# Patient Record
Sex: FEMALE | Race: WHITE | NOT HISPANIC OR LATINO | Employment: OTHER | RURAL
[De-identification: names, ages, dates, MRNs, and addresses within clinical notes are randomized per-mention and may not be internally consistent; named-entity substitution may affect disease eponyms.]

---

## 2019-12-09 ENCOUNTER — HISTORICAL (OUTPATIENT)
Dept: ADMINISTRATIVE | Facility: HOSPITAL | Age: 64
End: 2019-12-09

## 2019-12-11 LAB
LAB AP GENERAL CAT - HISTORICAL: NORMAL
LAB AP INTERPRETATION/RESULT - HISTORICAL: NEGATIVE
LAB AP SPECIMEN ADEQUACY - HISTORICAL: NORMAL
LAB AP SPECIMEN SUBMITTED - HISTORICAL: NORMAL

## 2020-02-18 LAB — CRC RECOMMENDATION EXT: NORMAL

## 2021-11-02 ENCOUNTER — OFFICE VISIT (OUTPATIENT)
Dept: FAMILY MEDICINE | Facility: CLINIC | Age: 66
End: 2021-11-02
Payer: MEDICARE

## 2021-11-02 VITALS
RESPIRATION RATE: 20 BRPM | BODY MASS INDEX: 23.01 KG/M2 | OXYGEN SATURATION: 97 % | DIASTOLIC BLOOD PRESSURE: 82 MMHG | TEMPERATURE: 97 F | HEART RATE: 64 BPM | SYSTOLIC BLOOD PRESSURE: 126 MMHG | WEIGHT: 146.63 LBS | HEIGHT: 67 IN

## 2021-11-02 DIAGNOSIS — Z12.31 SCREENING MAMMOGRAM FOR BREAST CANCER: ICD-10-CM

## 2021-11-02 DIAGNOSIS — Z87.891 FORMER HEAVY CIGARETTE SMOKER (20-39 PER DAY): ICD-10-CM

## 2021-11-02 DIAGNOSIS — J43.2 CENTRILOBULAR EMPHYSEMA: ICD-10-CM

## 2021-11-02 DIAGNOSIS — Z53.20 STATIN DECLINED: ICD-10-CM

## 2021-11-02 DIAGNOSIS — Z23 FLU VACCINE NEED: ICD-10-CM

## 2021-11-02 DIAGNOSIS — Z85.038 HISTORY OF COLON CANCER, STAGE II: Primary | ICD-10-CM

## 2021-11-02 DIAGNOSIS — E78.00 HYPERCHOLESTEREMIA: ICD-10-CM

## 2021-11-02 PROBLEM — J43.9 EMPHYSEMA OF LUNG: Status: ACTIVE | Noted: 2019-12-11

## 2021-11-02 PROCEDURE — 99213 PR OFFICE/OUTPT VISIT, EST, LEVL III, 20-29 MIN: ICD-10-PCS | Mod: ,,, | Performed by: NURSE PRACTITIONER

## 2021-11-02 PROCEDURE — 90662 FLU VACCINE - QUADRIVALENT - HIGH DOSE (65+) PRESERVATIVE FREE IM: ICD-10-PCS | Mod: ,,, | Performed by: NURSE PRACTITIONER

## 2021-11-02 PROCEDURE — 90662 IIV NO PRSV INCREASED AG IM: CPT | Mod: ,,, | Performed by: NURSE PRACTITIONER

## 2021-11-02 PROCEDURE — G0008 ADMIN INFLUENZA VIRUS VAC: HCPCS | Mod: ,,, | Performed by: NURSE PRACTITIONER

## 2021-11-02 PROCEDURE — 99213 OFFICE O/P EST LOW 20 MIN: CPT | Mod: ,,, | Performed by: NURSE PRACTITIONER

## 2021-11-02 PROCEDURE — G0008 FLU VACCINE - QUADRIVALENT - HIGH DOSE (65+) PRESERVATIVE FREE IM: ICD-10-PCS | Mod: ,,, | Performed by: NURSE PRACTITIONER

## 2021-11-15 ENCOUNTER — HOSPITAL ENCOUNTER (OUTPATIENT)
Dept: RADIOLOGY | Facility: HOSPITAL | Age: 66
Discharge: HOME | End: 2021-11-15
Attending: NURSE PRACTITIONER
Payer: MEDICARE

## 2021-11-15 DIAGNOSIS — Z87.891 FORMER HEAVY CIGARETTE SMOKER (20-39 PER DAY): ICD-10-CM

## 2021-11-15 PROCEDURE — 71271 CT THORAX LUNG CANCER SCR C-: CPT | Mod: 26,,, | Performed by: RADIOLOGY

## 2021-11-15 PROCEDURE — 71271 CT CHEST LUNG SCREENING LOW DOSE: ICD-10-PCS | Mod: 26,,, | Performed by: RADIOLOGY

## 2021-11-15 PROCEDURE — 71271 CT THORAX LUNG CANCER SCR C-: CPT | Mod: TC

## 2021-12-09 ENCOUNTER — HOSPITAL ENCOUNTER (OUTPATIENT)
Dept: RADIOLOGY | Facility: HOSPITAL | Age: 66
Discharge: HOME | End: 2021-12-09
Attending: NURSE PRACTITIONER
Payer: MEDICARE

## 2021-12-09 DIAGNOSIS — Z12.31 SCREENING MAMMOGRAM FOR BREAST CANCER: ICD-10-CM

## 2021-12-09 PROCEDURE — 77067 SCR MAMMO BI INCL CAD: CPT | Mod: TC

## 2021-12-11 DIAGNOSIS — R92.8 ABNORMALITY OF LEFT BREAST ON SCREENING MAMMOGRAM: ICD-10-CM

## 2021-12-11 DIAGNOSIS — Z87.891 FORMER HEAVY CIGARETTE SMOKER (20-39 PER DAY): Primary | ICD-10-CM

## 2021-12-29 ENCOUNTER — HOSPITAL ENCOUNTER (OUTPATIENT)
Dept: RADIOLOGY | Facility: HOSPITAL | Age: 66
Discharge: HOME | End: 2021-12-29
Attending: NURSE PRACTITIONER
Payer: MEDICARE

## 2021-12-29 DIAGNOSIS — R92.8 ABNORMALITY OF LEFT BREAST ON SCREENING MAMMOGRAM: ICD-10-CM

## 2021-12-29 PROCEDURE — 77065 DX MAMMO INCL CAD UNI: CPT | Mod: TC,LT

## 2021-12-29 PROCEDURE — 76642 ULTRASOUND BREAST LIMITED: CPT | Mod: TC,LT

## 2022-03-11 DIAGNOSIS — Z71.89 COMPLEX CARE COORDINATION: ICD-10-CM

## 2022-10-09 DIAGNOSIS — Z71.89 COMPLEX CARE COORDINATION: ICD-10-CM

## 2022-11-07 ENCOUNTER — OFFICE VISIT (OUTPATIENT)
Dept: FAMILY MEDICINE | Facility: CLINIC | Age: 67
End: 2022-11-07
Payer: MEDICARE

## 2022-11-07 VITALS
HEIGHT: 67 IN | RESPIRATION RATE: 18 BRPM | BODY MASS INDEX: 23.39 KG/M2 | HEART RATE: 62 BPM | SYSTOLIC BLOOD PRESSURE: 128 MMHG | DIASTOLIC BLOOD PRESSURE: 80 MMHG | OXYGEN SATURATION: 96 % | TEMPERATURE: 98 F | WEIGHT: 149 LBS

## 2022-11-07 DIAGNOSIS — Z53.20 STATIN DECLINED: Chronic | ICD-10-CM

## 2022-11-07 DIAGNOSIS — Z23 FLU VACCINE NEED: ICD-10-CM

## 2022-11-07 DIAGNOSIS — Z87.891 FORMER HEAVY CIGARETTE SMOKER (20-39 PER DAY): Chronic | ICD-10-CM

## 2022-11-07 DIAGNOSIS — Z85.038 HISTORY OF COLON CANCER, STAGE II: ICD-10-CM

## 2022-11-07 DIAGNOSIS — J43.2 CENTRILOBULAR EMPHYSEMA: ICD-10-CM

## 2022-11-07 DIAGNOSIS — E78.00 HYPERCHOLESTEREMIA: Primary | Chronic | ICD-10-CM

## 2022-11-07 DIAGNOSIS — Z11.59 NEED FOR HEPATITIS C SCREENING TEST: ICD-10-CM

## 2022-11-07 DIAGNOSIS — Z72.0 VAPES NICOTINE CONTAINING SUBSTANCE: ICD-10-CM

## 2022-11-07 DIAGNOSIS — Z12.89 ENCOUNTER FOR PELVIC SCREENING FOR CANCER: ICD-10-CM

## 2022-11-07 DIAGNOSIS — Z12.31 BREAST CANCER SCREENING BY MAMMOGRAM: ICD-10-CM

## 2022-11-07 PROBLEM — J43.9 EMPHYSEMA OF LUNG: Chronic | Status: ACTIVE | Noted: 2019-12-11

## 2022-11-07 LAB
ALBUMIN SERPL BCP-MCNC: 3.8 G/DL (ref 3.5–5)
ALBUMIN/GLOB SERPL: 1.1 {RATIO}
ALP SERPL-CCNC: 74 U/L (ref 55–142)
ALT SERPL W P-5'-P-CCNC: 31 U/L (ref 13–56)
ANION GAP SERPL CALCULATED.3IONS-SCNC: 10 MMOL/L (ref 7–16)
AST SERPL W P-5'-P-CCNC: 21 U/L (ref 15–37)
BASOPHILS # BLD AUTO: 0.05 K/UL (ref 0–0.2)
BASOPHILS NFR BLD AUTO: 0.8 % (ref 0–1)
BILIRUB SERPL-MCNC: 0.9 MG/DL (ref ?–1.2)
BUN SERPL-MCNC: 13 MG/DL (ref 7–18)
BUN/CREAT SERPL: 14 (ref 6–20)
CALCIUM SERPL-MCNC: 9.4 MG/DL (ref 8.5–10.1)
CHLORIDE SERPL-SCNC: 107 MMOL/L (ref 98–107)
CHOLEST SERPL-MCNC: 225 MG/DL (ref 0–200)
CHOLEST/HDLC SERPL: 3.1 {RATIO}
CO2 SERPL-SCNC: 27 MMOL/L (ref 21–32)
CREAT SERPL-MCNC: 0.9 MG/DL (ref 0.55–1.02)
DIFFERENTIAL METHOD BLD: ABNORMAL
EGFR (NO RACE VARIABLE) (RUSH/TITUS): 69 ML/MIN/1.73M²
EOSINOPHIL # BLD AUTO: 0.27 K/UL (ref 0–0.5)
EOSINOPHIL NFR BLD AUTO: 4.4 % (ref 1–4)
ERYTHROCYTE [DISTWIDTH] IN BLOOD BY AUTOMATED COUNT: 13.4 % (ref 11.5–14.5)
GLOBULIN SER-MCNC: 3.6 G/DL (ref 2–4)
GLUCOSE SERPL-MCNC: 88 MG/DL (ref 74–106)
HCT VFR BLD AUTO: 39.9 % (ref 38–47)
HDLC SERPL-MCNC: 72 MG/DL (ref 40–60)
HGB BLD-MCNC: 12.6 G/DL (ref 12–16)
IMM GRANULOCYTES # BLD AUTO: 0.01 K/UL (ref 0–0.04)
IMM GRANULOCYTES NFR BLD: 0.2 % (ref 0–0.4)
LDLC SERPL CALC-MCNC: 136 MG/DL
LDLC/HDLC SERPL: 1.9 {RATIO}
LYMPHOCYTES # BLD AUTO: 1.97 K/UL (ref 1–4.8)
LYMPHOCYTES NFR BLD AUTO: 32.3 % (ref 27–41)
MCH RBC QN AUTO: 28.8 PG (ref 27–31)
MCHC RBC AUTO-ENTMCNC: 31.6 G/DL (ref 32–36)
MCV RBC AUTO: 91.3 FL (ref 80–96)
MONOCYTES # BLD AUTO: 0.79 K/UL (ref 0–0.8)
MONOCYTES NFR BLD AUTO: 13 % (ref 2–6)
MPC BLD CALC-MCNC: 12.6 FL (ref 9.4–12.4)
NEUTROPHILS # BLD AUTO: 3.01 K/UL (ref 1.8–7.7)
NEUTROPHILS NFR BLD AUTO: 49.3 % (ref 53–65)
NONHDLC SERPL-MCNC: 153 MG/DL
NRBC # BLD AUTO: 0 X10E3/UL
NRBC, AUTO (.00): 0 %
PLATELET # BLD AUTO: 179 K/UL (ref 150–400)
POTASSIUM SERPL-SCNC: 4.1 MMOL/L (ref 3.5–5.1)
PROT SERPL-MCNC: 7.4 G/DL (ref 6.4–8.2)
RBC # BLD AUTO: 4.37 M/UL (ref 4.2–5.4)
SODIUM SERPL-SCNC: 140 MMOL/L (ref 136–145)
TRIGL SERPL-MCNC: 87 MG/DL (ref 35–150)
VLDLC SERPL-MCNC: 17 MG/DL
WBC # BLD AUTO: 6.1 K/UL (ref 4.5–11)

## 2022-11-07 PROCEDURE — G0008 FLU VACCINE - QUADRIVALENT - ADJUVANTED: ICD-10-PCS | Mod: ,,, | Performed by: NURSE PRACTITIONER

## 2022-11-07 PROCEDURE — 85025 CBC WITH DIFFERENTIAL: ICD-10-PCS | Mod: ,,, | Performed by: CLINICAL MEDICAL LABORATORY

## 2022-11-07 PROCEDURE — 80053 COMPREHENSIVE METABOLIC PANEL: ICD-10-PCS | Mod: ,,, | Performed by: CLINICAL MEDICAL LABORATORY

## 2022-11-07 PROCEDURE — 80053 COMPREHEN METABOLIC PANEL: CPT | Mod: ,,, | Performed by: CLINICAL MEDICAL LABORATORY

## 2022-11-07 PROCEDURE — G0008 ADMIN INFLUENZA VIRUS VAC: HCPCS | Mod: ,,, | Performed by: NURSE PRACTITIONER

## 2022-11-07 PROCEDURE — 86803 HEPATITIS C ANTIBODY: ICD-10-PCS | Mod: ,,, | Performed by: CLINICAL MEDICAL LABORATORY

## 2022-11-07 PROCEDURE — 99214 PR OFFICE/OUTPT VISIT, EST, LEVL IV, 30-39 MIN: ICD-10-PCS | Mod: ,,, | Performed by: NURSE PRACTITIONER

## 2022-11-07 PROCEDURE — 80061 LIPID PANEL: CPT | Mod: ,,, | Performed by: CLINICAL MEDICAL LABORATORY

## 2022-11-07 PROCEDURE — 85025 COMPLETE CBC W/AUTO DIFF WBC: CPT | Mod: ,,, | Performed by: CLINICAL MEDICAL LABORATORY

## 2022-11-07 PROCEDURE — 86803 HEPATITIS C AB TEST: CPT | Mod: ,,, | Performed by: CLINICAL MEDICAL LABORATORY

## 2022-11-07 PROCEDURE — 90694 VACC AIIV4 NO PRSRV 0.5ML IM: CPT | Mod: ,,, | Performed by: NURSE PRACTITIONER

## 2022-11-07 PROCEDURE — 90694 FLU VACCINE - QUADRIVALENT - ADJUVANTED: ICD-10-PCS | Mod: ,,, | Performed by: NURSE PRACTITIONER

## 2022-11-07 PROCEDURE — 80061 LIPID PANEL: ICD-10-PCS | Mod: ,,, | Performed by: CLINICAL MEDICAL LABORATORY

## 2022-11-07 PROCEDURE — 99214 OFFICE O/P EST MOD 30 MIN: CPT | Mod: ,,, | Performed by: NURSE PRACTITIONER

## 2022-11-07 RX ORDER — MAGNESIUM 250 MG
1 TABLET ORAL DAILY
COMMUNITY

## 2022-11-07 NOTE — PROGRESS NOTES
"[facility] MEDICINE       PATIENT NAME: Mirian Gallegos   : 1955    AGE: 67 y.o. DATE OF ENCOUNTER: 22    MRN: [id number]      PCP: SENAIT Mendez    Reason for Visit / Chief Complaint:  Annual Exam (Pt presents to clinic for annual exam.  Pt is currently fasting.  Complains of some congestion with cough since last Tuesday)     Subjective:     HPI:    Presents for yearly f/u.  Hx HLD, statin or other med declined.  Hx stage 2 colon CA dx 2020 after a positive Cologuard.  Oncologist: Dr. Bear released her.  Yearly blood work w/ me & return to Onc only if there is a problem in future.    Smoked at least 1 ppd x 50 years = 50 year pack history.  Quit smoking cigarettes 19, still vaping w/ nicotine.     Developed a cold w/ cough last week, "hit hard", feeling much better but still coughing.  Cough doesn't keep her awake at night.    Review of Systems:     Review of Systems   Constitutional: Negative.    HENT:  Positive for rhinorrhea. Negative for ear pain, sneezing and sore throat.    Respiratory:  Positive for cough. Negative for shortness of breath.    Cardiovascular: Negative.    Gastrointestinal: Negative.    Skin: Negative.    Neurological: Negative.    Psychiatric/Behavioral: Negative.       Allergies:     Review of patient's allergies indicates:  No Known Allergies     Objective:      Wt Readings from Last 3 Encounters:   22 0825 67.6 kg (149 lb)   21 1351 66.5 kg (146 lb 9.6 oz)     Vitals:    22 0825   BP: 128/80   BP Location: Right arm   Patient Position: Sitting   BP Method: Large (Manual)   Pulse: 62   Resp: 18   Temp: 98.2 °F (36.8 °C)   SpO2: 96%   Weight: 67.6 kg (149 lb)   Height: 5' 7" (1.702 m)     Body mass index is 23.34 kg/m².     Physical Exam:    Physical Exam  Vitals and nursing note reviewed.   Constitutional:       General: She is not in acute distress.     Appearance: Normal appearance.   HENT:      Head: " Normocephalic.      Right Ear: Tympanic membrane, ear canal and external ear normal.      Left Ear: Tympanic membrane, ear canal and external ear normal.      Nose: Nose normal.      Mouth/Throat:      Mouth: Mucous membranes are moist.      Pharynx: Oropharynx is clear. No posterior oropharyngeal erythema (grayish colored PND).   Eyes:      Conjunctiva/sclera: Conjunctivae normal.      Pupils: Pupils are equal, round, and reactive to light.   Neck:      Thyroid: No thyroid mass or thyromegaly.      Vascular: Normal carotid pulses. No carotid bruit.   Cardiovascular:      Rate and Rhythm: Normal rate and regular rhythm.      Pulses: Normal pulses.      Heart sounds: Normal heart sounds.   Pulmonary:      Effort: Pulmonary effort is normal.      Breath sounds: Normal breath sounds.   Abdominal:      Palpations: Abdomen is soft.      Tenderness: There is no abdominal tenderness.   Musculoskeletal:      Cervical back: Neck supple.      Right lower leg: No edema.      Left lower leg: No edema.   Lymphadenopathy:      Cervical: No cervical adenopathy.   Skin:     General: Skin is warm and dry.   Neurological:      General: No focal deficit present.      Mental Status: She is alert and oriented to person, place, and time.   Psychiatric:         Mood and Affect: Mood normal.         Behavior: Behavior normal.        Assessment:          ICD-10-CM ICD-9-CM   1. Hypercholesteremia  E78.00 272.0   2. Statin declined  Z53.20 V64.2   3. History of colon cancer, stage II  Z85.038 V10.05   4. Centrilobular emphysema  J43.2 492.8   5. Former heavy cigarette smoker (20-39 per day)  Z87.891 V15.82   6. Need for hepatitis C screening test  Z11.59 V73.89   7. Flu vaccine need  Z23 V04.81   8. Encounter for pelvic screening for cancer  Z12.89 V76.49   9. Body mass index (BMI) 23.0-23.9, adult  Z68.23 V85.1   10. Breast cancer screening by mammogram  Z12.31 V76.12   11. Vapes nicotine containing substance  Z72.0 305.1        Plan:        Hypercholesteremia  -     Comprehensive Metabolic Panel; Future; Expected date: 11/07/2022  -     Lipid Panel; Future; Expected date: 11/07/2022    Statin declined    History of colon cancer, stage II  -     CBC Auto Differential; Future; Expected date: 11/07/2022  -     Comprehensive Metabolic Panel; Future; Expected date: 11/07/2022    Centrilobular emphysema  -     CBC Auto Differential; Future; Expected date: 11/07/2022    Former heavy cigarette smoker (20-39 per day)  -     CT Chest Lung Screening Low Dose; Future; Expected date: 11/16/2022    Need for hepatitis C screening test  -     Hepatitis C Antibody; Future; Expected date: 11/07/2022    Flu vaccine need  -     Influenza - Quadrivalent (Adjuvanted)    Encounter for pelvic screening for cancer  -     Ambulatory referral/consult to Obstetrics / Gynecology; Future; Expected date: 11/14/2022    Body mass index (BMI) 23.0-23.9, adult    Breast cancer screening by mammogram  -     Mammo Digital Screening Bilat; Future; Expected date: 11/07/2022    Vapes nicotine containing substance  -     CT Chest Lung Screening Low Dose; Future; Expected date: 11/16/2022      Current Outpatient Medications:     ASHWAGANDHA ROOT EXTRACT ORAL, Take 1 tablet by mouth Daily., Disp: , Rfl:     magnesium 250 mg Tab, Take 1 tablet by mouth once daily., Disp: , Rfl:     multivit with calcium,iron,min (WOMEN'S MULTIPLE VITAMINS ORAL), Take 1 tablet by mouth Daily., Disp: , Rfl:     New & refilled meds:  Requested Prescriptions      No prescriptions requested or ordered in this encounter     Declines further covid boosters.  Declines DEXA.     Refer to Hedy Sifuentes CNM, for updated pap.  Refer to update mammo end of December.  Refer to update LDCT after 11/15/22.    High dose flu shot today  Labs today - will notify when reviewed    Declines [facility] appt 12/1/22 & declines rescheduling.  Return to clinic 1 yr w/ fasting labs; and f/u as needed.       Signature:  Tiffany Ray,  FNP

## 2022-11-08 LAB — HCV AB SER QL: NORMAL

## 2022-11-29 ENCOUNTER — OFFICE VISIT (OUTPATIENT)
Dept: FAMILY MEDICINE | Facility: CLINIC | Age: 67
End: 2022-11-29
Payer: MEDICARE

## 2022-11-29 VITALS
OXYGEN SATURATION: 100 % | BODY MASS INDEX: 22.13 KG/M2 | TEMPERATURE: 98 F | HEIGHT: 67 IN | HEART RATE: 60 BPM | DIASTOLIC BLOOD PRESSURE: 78 MMHG | RESPIRATION RATE: 18 BRPM | SYSTOLIC BLOOD PRESSURE: 138 MMHG | WEIGHT: 141 LBS

## 2022-11-29 DIAGNOSIS — J43.2 CENTRILOBULAR EMPHYSEMA: Chronic | ICD-10-CM

## 2022-11-29 DIAGNOSIS — J22 LRTI (LOWER RESPIRATORY TRACT INFECTION): Primary | ICD-10-CM

## 2022-11-29 DIAGNOSIS — R68.83 CHILLS: ICD-10-CM

## 2022-11-29 PROCEDURE — 99213 PR OFFICE/OUTPT VISIT, EST, LEVL III, 20-29 MIN: ICD-10-PCS | Mod: ,,, | Performed by: NURSE PRACTITIONER

## 2022-11-29 PROCEDURE — 96372 PR INJECTION,THERAP/PROPH/DIAG2ST, IM OR SUBCUT: ICD-10-PCS | Mod: ,,, | Performed by: NURSE PRACTITIONER

## 2022-11-29 PROCEDURE — 96372 THER/PROPH/DIAG INJ SC/IM: CPT | Mod: ,,, | Performed by: NURSE PRACTITIONER

## 2022-11-29 PROCEDURE — 99213 OFFICE O/P EST LOW 20 MIN: CPT | Mod: ,,, | Performed by: NURSE PRACTITIONER

## 2022-11-29 RX ORDER — AZITHROMYCIN 250 MG/1
TABLET, FILM COATED ORAL
Qty: 6 TABLET | Refills: 0 | Status: SHIPPED | OUTPATIENT
Start: 2022-11-29 | End: 2022-12-08 | Stop reason: ALTCHOICE

## 2022-11-29 RX ORDER — CEFTRIAXONE 1 G/1
1 INJECTION, POWDER, FOR SOLUTION INTRAMUSCULAR; INTRAVENOUS
Status: COMPLETED | OUTPATIENT
Start: 2022-11-29 | End: 2022-11-29

## 2022-11-29 RX ADMIN — CEFTRIAXONE 1 G: 1 INJECTION, POWDER, FOR SOLUTION INTRAMUSCULAR; INTRAVENOUS at 03:11

## 2022-11-29 NOTE — PROGRESS NOTES
[facility] - FAMILY MEDICINE       PATIENT NAME: Mirian Gallegos   : 1955    AGE: 67 y.o. DATE OF ENCOUNTER: 22   MRN: [id number]      Visit type: WALK-IN  PCP:  SENAIT Mendez    Reason for Visit / Chief Complaint: Fatigue (Fatigue and chills for several days. Chest congestion started today)     Subjective:     Presents for malaise & cough.  Had URI w/ cough reported to be resolving at visit 3 wks ago that never completely went away then cough worsened w/ chest congestion & malaise.    Review of Systems:     Review of Systems   Constitutional:  Positive for chills and fatigue. Negative for fever.   HENT:  Positive for postnasal drip. Negative for congestion, ear pain, sinus pressure and sore throat.    Respiratory:  Positive for cough. Negative for shortness of breath and wheezing.    Cardiovascular: Negative.    Gastrointestinal:  Negative for abdominal pain, diarrhea, nausea and vomiting.   Musculoskeletal:  Negative for myalgias.   Skin: Negative.    Neurological:  Negative for headaches.     Allergies:   Review of patient's allergies indicates:  No Known Allergies     Medical History:     Past Medical History:   Diagnosis Date    Colon cancer 2020    stage 2, Dr. Albaro Garcia - robotic assisted laparoscopic right hemicolectomy 2020    Emphysema of lung 2019    noted on LDCT    Hypercholesteremia 2019    declined statin    Statin declined       Social History     Tobacco Use   Smoking Status Former    Packs/day: 1.00    Years: 50.00    Pack years: 50.00    Types: Vaping with nicotine, Cigarettes    Start date:     Quit date: 2021    Years since quittin.8   Smokeless Tobacco Never        Objective:     Vitals:    22 1423 22 1425   BP: (!) 144/82 138/78   BP Location: Right arm Left arm   Patient Position: Sitting Sitting   BP Method: Large (Manual) Large (Manual)   Pulse: 60    Resp: 18    Temp: 98.1 °F (36.7 °C)    TempSrc: Oral  "   SpO2: 100%    Weight: 64 kg (141 lb)    Height: 5' 7" (1.702 m)      Body mass index is 22.08 kg/m².     Physical Exam:    Physical Exam  Vitals and nursing note reviewed.   Constitutional:       General: She is not in acute distress.     Appearance: Normal appearance. She is not ill-appearing.   HENT:      Head: Normocephalic.      Right Ear: Tympanic membrane, ear canal and external ear normal.      Left Ear: Tympanic membrane, ear canal and external ear normal.      Nose: Congestion and rhinorrhea present. Rhinorrhea is clear.      Mouth/Throat:      Mouth: Mucous membranes are moist.      Pharynx: No posterior oropharyngeal erythema (injected, grayish PND).   Eyes:      Conjunctiva/sclera: Conjunctivae normal.   Cardiovascular:      Rate and Rhythm: Normal rate and regular rhythm.      Heart sounds: Normal heart sounds.   Pulmonary:      Effort: Pulmonary effort is normal. No respiratory distress.      Breath sounds: Rales (bibasilar) present. No wheezing or rhonchi.   Musculoskeletal:      Cervical back: Neck supple.   Skin:     General: Skin is warm and dry.   Neurological:      Mental Status: She is alert and oriented to person, place, and time.       Assessment:          ICD-10-CM ICD-9-CM   1. LRTI (lower respiratory tract infection)  J22 519.8   2. Chills  R68.83 780.64   3. Centrilobular emphysema  J43.2 492.8        Plan:     LRTI (lower respiratory tract infection)  -     cefTRIAXone injection 1 g  -     azithromycin (Z-CAROL) 250 MG tablet; Take 2 tablets by mouth on day 1; Take 1 tablet by mouth on days 2-5  Dispense: 6 tablet; Refill: 0    Chills  -     POCT SARS-COV2 (COVID) with Flu Antigen    Centrilobular emphysema      Current Outpatient Medications:     ASHWAGANDHA ROOT EXTRACT ORAL, Take 1 tablet by mouth Daily., Disp: , Rfl:     magnesium 250 mg Tab, Take 1 tablet by mouth once daily., Disp: , Rfl:     multivit with calcium,iron,min (WOMEN'S MULTIPLE VITAMINS ORAL), Take 1 tablet by mouth " Daily., Disp: , Rfl:     azithromycin (Z-CAROL) 250 MG tablet, Take 2 tablets by mouth on day 1; Take 1 tablet by mouth on days 2-5, Disp: 6 tablet, Rfl: 0  No current facility-administered medications for this visit.    Requested Prescriptions     Signed Prescriptions Disp Refills    azithromycin (Z-CAROL) 250 MG tablet 6 tablet 0     Sig: Take 2 tablets by mouth on day 1; Take 1 tablet by mouth on days 2-5        Rapid flu A neg B neg    No xray coverage in clinic. Cover for mycoplasma.   Needs to quit smoking.    F/u as needed or if symptoms worsen or persist.  Future Appointments   Date Time Provider Department Center   1/4/2023  1:30 PM [facility] MAMMO1 R[facility]B [facility]       Signature: Tiffany Ray [facility]

## 2022-12-01 ENCOUNTER — TELEPHONE (OUTPATIENT)
Dept: FAMILY MEDICINE | Facility: CLINIC | Age: 67
End: 2022-12-01
Payer: MEDICARE

## 2022-12-01 DIAGNOSIS — Z78.0 POSTMENOPAUSAL: Primary | ICD-10-CM

## 2022-12-01 NOTE — TELEPHONE ENCOUNTER
----- Message from Donna Best MT sent at 12/1/2022  1:38 PM CST -----  Regarding: DXA  Cancel current order for DXA and reorder to schedule appt with imaging.

## 2022-12-05 ENCOUNTER — TELEPHONE (OUTPATIENT)
Dept: FAMILY MEDICINE | Facility: CLINIC | Age: 67
End: 2022-12-05
Payer: MEDICARE

## 2022-12-05 DIAGNOSIS — Z72.0 VAPES NICOTINE CONTAINING SUBSTANCE: ICD-10-CM

## 2022-12-05 DIAGNOSIS — Z87.891 FORMER HEAVY CIGARETTE SMOKER (20-39 PER DAY): Primary | Chronic | ICD-10-CM

## 2022-12-05 NOTE — TELEPHONE ENCOUNTER
----- Message from Donna Best MT sent at 12/2/2022 11:17 AM CST -----  Regarding: new orders, old orders, orders everywhere  What did I tell you about Mirian Gallegos's orders? I sent over the orders for the DXA and the Mammo and had appts scheduled for those. I called her and gave her those appts but she does not want the DXA scan. Did we redo the orders for the CT? If we didn't we need to cancel the CT orders on 11/07 and reorder those so I can schedule them.

## 2022-12-05 NOTE — TELEPHONE ENCOUNTER
----- Message from Donna Best MT sent at 12/1/2022  1:38 PM CST -----  Regarding: DXA  Cancel current order for DXA and reorder to schedule appt with imaging.       This is what you had sent me 12/1/22, so re-ordered the DXA.  I will re-order the LDCT.

## 2022-12-08 ENCOUNTER — OFFICE VISIT (OUTPATIENT)
Dept: FAMILY MEDICINE | Facility: CLINIC | Age: 67
End: 2022-12-08
Payer: MEDICARE

## 2022-12-08 ENCOUNTER — APPOINTMENT (OUTPATIENT)
Dept: RADIOLOGY | Facility: CLINIC | Age: 67
End: 2022-12-08
Attending: NURSE PRACTITIONER
Payer: MEDICARE

## 2022-12-08 VITALS
TEMPERATURE: 98 F | HEIGHT: 67 IN | DIASTOLIC BLOOD PRESSURE: 80 MMHG | WEIGHT: 141 LBS | BODY MASS INDEX: 22.13 KG/M2 | SYSTOLIC BLOOD PRESSURE: 130 MMHG | OXYGEN SATURATION: 99 % | RESPIRATION RATE: 18 BRPM | HEART RATE: 60 BPM

## 2022-12-08 DIAGNOSIS — R53.81 MALAISE AND FATIGUE: ICD-10-CM

## 2022-12-08 DIAGNOSIS — J43.2 CENTRILOBULAR EMPHYSEMA: Chronic | ICD-10-CM

## 2022-12-08 DIAGNOSIS — J43.9 ACUTE EXACERBATION OF EMPHYSEMA: Primary | ICD-10-CM

## 2022-12-08 DIAGNOSIS — R53.83 MALAISE AND FATIGUE: ICD-10-CM

## 2022-12-08 DIAGNOSIS — J43.2 CENTRILOBULAR EMPHYSEMA: ICD-10-CM

## 2022-12-08 DIAGNOSIS — R05.1 ACUTE COUGH: ICD-10-CM

## 2022-12-08 PROBLEM — J44.1 ACUTE EXACERBATION OF EMPHYSEMA: Status: ACTIVE | Noted: 2022-12-08

## 2022-12-08 PROCEDURE — 94640 PR INHAL RX, AIRWAY OBST/DX SPUTUM INDUCT: ICD-10-PCS | Mod: ,,, | Performed by: NURSE PRACTITIONER

## 2022-12-08 PROCEDURE — 99213 OFFICE O/P EST LOW 20 MIN: CPT | Mod: ,,, | Performed by: NURSE PRACTITIONER

## 2022-12-08 PROCEDURE — 71046 X-RAY EXAM CHEST 2 VIEWS: CPT | Mod: TC,RHCUB,FY | Performed by: NURSE PRACTITIONER

## 2022-12-08 PROCEDURE — 99213 PR OFFICE/OUTPT VISIT, EST, LEVL III, 20-29 MIN: ICD-10-PCS | Mod: ,,, | Performed by: NURSE PRACTITIONER

## 2022-12-08 PROCEDURE — 94640 AIRWAY INHALATION TREATMENT: CPT | Mod: ,,, | Performed by: NURSE PRACTITIONER

## 2022-12-08 RX ORDER — IPRATROPIUM BROMIDE AND ALBUTEROL SULFATE 2.5; .5 MG/3ML; MG/3ML
3 SOLUTION RESPIRATORY (INHALATION)
Status: COMPLETED | OUTPATIENT
Start: 2022-12-08 | End: 2022-12-08

## 2022-12-08 RX ADMIN — IPRATROPIUM BROMIDE AND ALBUTEROL SULFATE 3 ML: 2.5; .5 SOLUTION RESPIRATORY (INHALATION) at 03:12

## 2022-12-08 NOTE — PROGRESS NOTES
"[facility] FAMILY MEDICINE       PATIENT NAME: Mirian Gallegos   : 1955    AGE: 67 y.o. DATE OF ENCOUNTER: 22    MRN: [id number]      PCP: SENAIT Mendez    Reason for Visit / Chief Complaint:  URI (Patient presents to clinic with complaints of upper respiratory issues.  Seen last Tuesday for URI given antibiotic shot and po antibiotics.  Finish po antibiotics on )     Subjective:     HPI:    Presents for f/u treatment for LRTI last week.  Completed zpak 4 days ago.      Feels bad, no energy, still coughing but nonproductive.  Denies fever, SOB, or wheezing.  Quit smoking cigs 2021, vapes w/ nicotine but not ready to quit yet.  On inhalers, doesn't have a nebulizer.     Review of Systems:     Review of Systems   Constitutional:  Positive for fatigue. Negative for chills, fever and unexpected weight change.   HENT:  Positive for congestion, postnasal drip and sinus pressure. Negative for ear pain and sore throat.    Respiratory:  Positive for cough. Negative for shortness of breath and wheezing.    Cardiovascular: Negative.    Neurological: Negative.      Allergies:     Review of patient's allergies indicates:  No Known Allergies     Objective:      Vitals:    22 1520   BP: 130/80   BP Location: Right arm   Patient Position: Sitting   BP Method: Large (Manual)   Pulse: 60   Resp: 18   Temp: 98 °F (36.7 °C)   TempSrc: Oral   SpO2: 99%   Weight: 64 kg (141 lb)   Height: 5' 7" (1.702 m)     Body mass index is 22.08 kg/m².     Physical Exam:    Physical Exam  Vitals and nursing note reviewed.   Constitutional:       General: She is not in acute distress.     Appearance: Normal appearance.   HENT:      Head: Normocephalic.      Right Ear: Tympanic membrane, ear canal and external ear normal.      Left Ear: Tympanic membrane, ear canal and external ear normal.      Nose: Nose normal.      Mouth/Throat:      Mouth: Mucous membranes are moist.      Comments: Mild " pharyngeal injection with streaks of grayish PND.  Eyes:      Conjunctiva/sclera: Conjunctivae normal.   Neck:      Thyroid: No thyromegaly.      Trachea: Trachea normal.   Cardiovascular:      Rate and Rhythm: Normal rate and regular rhythm.      Pulses: Normal pulses.      Heart sounds: Normal heart sounds.   Pulmonary:      Effort: Pulmonary effort is normal. No respiratory distress.      Breath sounds: Wheezing (diminished breath sounds with wheezing throughout lung fields;  post nebulizer treatment increased air movement with no wheezing, rales, or rhonchi; reports feels better) present. No rhonchi or rales.   Musculoskeletal:      Cervical back: Neck supple.      Right lower leg: No edema.      Left lower leg: No edema.   Lymphadenopathy:      Cervical: No cervical adenopathy.   Skin:     General: Skin is warm and dry.   Neurological:      General: No focal deficit present.      Mental Status: She is alert and oriented to person, place, and time.   Psychiatric:         Mood and Affect: Mood normal.         Behavior: Behavior normal.        Assessment:          ICD-10-CM ICD-9-CM   1. Acute exacerbation of emphysema  J43.9 491.21   2. Malaise and fatigue  R53.81 780.79    R53.83    3. Centrilobular emphysema  J43.2 492.8        Plan:       Acute exacerbation of emphysema  -     X-Ray Chest PA And Lateral; Future; Expected date: 12/08/2022    Malaise and fatigue  -     X-Ray Chest PA And Lateral; Future; Expected date: 12/08/2022    Centrilobular emphysema  -     X-Ray Chest PA And Lateral; Future; Expected date: 12/08/2022  -     albuterol-ipratropium 2.5 mg-0.5 mg/3 mL nebulizer solution 3 mL  -     ipratropium-albuteroL (COMBIVENT)  mcg/actuation inhaler; Inhale 1 puff into the lungs every 6 (six) hours as needed for Wheezing or Shortness of Breath. Rescue  Dispense: 4 g; Refill: 2      Current Outpatient Medications:     ASHWAGANDHA ROOT EXTRACT ORAL, Take 1 tablet by mouth Daily., Disp: , Rfl:      magnesium 250 mg Tab, Take 1 tablet by mouth once daily., Disp: , Rfl:     multivit with calcium,iron,min (WOMEN'S MULTIPLE VITAMINS ORAL), Take 1 tablet by mouth Daily., Disp: , Rfl:     ipratropium-albuteroL (COMBIVENT)  mcg/actuation inhaler, Inhale 1 puff into the lungs every 6 (six) hours as needed for Wheezing or Shortness of Breath. Rescue, Disp: 4 g, Rfl: 2  No current facility-administered medications for this visit.    New & refilled meds:  Requested Prescriptions     Signed Prescriptions Disp Refills    ipratropium-albuteroL (COMBIVENT)  mcg/actuation inhaler 4 g 2     Sig: Inhale 1 puff into the lungs every 6 (six) hours as needed for Wheezing or Shortness of Breath. Rescue     Normal CBC & CMP 11/07/22 reviewed.  CXR with mild chronic lung changes, no pneumonia  Combivent inhaler to pharmacy & advised to use regularly through the weekend and let me know how she is doing the first of next week.  Voices understanding.    Return to clinic as needed.    Signature:  SENAIT Mendez

## 2022-12-19 ENCOUNTER — HOSPITAL ENCOUNTER (OUTPATIENT)
Dept: RADIOLOGY | Facility: HOSPITAL | Age: 67
Discharge: HOME | End: 2022-12-19
Attending: NURSE PRACTITIONER
Payer: MEDICARE

## 2022-12-19 ENCOUNTER — OFFICE VISIT (OUTPATIENT)
Dept: OBSTETRICS AND GYNECOLOGY | Facility: CLINIC | Age: 67
End: 2022-12-19
Payer: MEDICARE

## 2022-12-19 VITALS
DIASTOLIC BLOOD PRESSURE: 70 MMHG | HEIGHT: 67 IN | SYSTOLIC BLOOD PRESSURE: 122 MMHG | BODY MASS INDEX: 22.29 KG/M2 | WEIGHT: 142 LBS

## 2022-12-19 VITALS — HEIGHT: 67 IN | WEIGHT: 140 LBS | BODY MASS INDEX: 21.97 KG/M2

## 2022-12-19 DIAGNOSIS — Z01.419 NORMAL GYNECOLOGIC EXAMINATION: Primary | ICD-10-CM

## 2022-12-19 DIAGNOSIS — Z12.89 ENCOUNTER FOR PELVIC SCREENING FOR CANCER: ICD-10-CM

## 2022-12-19 DIAGNOSIS — Z87.891 FORMER HEAVY CIGARETTE SMOKER (20-39 PER DAY): ICD-10-CM

## 2022-12-19 DIAGNOSIS — Z72.0 VAPES NICOTINE CONTAINING SUBSTANCE: ICD-10-CM

## 2022-12-19 PROCEDURE — 87624 HPV HI-RISK TYP POOLED RSLT: CPT | Mod: ,,, | Performed by: CLINICAL MEDICAL LABORATORY

## 2022-12-19 PROCEDURE — G0123 SCREEN CERV/VAG THIN LAYER: HCPCS | Mod: TC,GCY | Performed by: ADVANCED PRACTICE MIDWIFE

## 2022-12-19 PROCEDURE — 71271 CT THORAX LUNG CANCER SCR C-: CPT | Mod: TC

## 2022-12-19 PROCEDURE — 99213 OFFICE O/P EST LOW 20 MIN: CPT | Mod: PBBFAC,25 | Performed by: ADVANCED PRACTICE MIDWIFE

## 2022-12-19 PROCEDURE — 71271 CT THORAX LUNG CANCER SCR C-: CPT | Mod: 26,,, | Performed by: RADIOLOGY

## 2022-12-19 PROCEDURE — G0101 PR CA SCREEN;PELVIC/BREAST EXAM: ICD-10-PCS | Mod: S$PBB,,, | Performed by: ADVANCED PRACTICE MIDWIFE

## 2022-12-19 PROCEDURE — G0101 CA SCREEN;PELVIC/BREAST EXAM: HCPCS | Mod: S$PBB,,, | Performed by: ADVANCED PRACTICE MIDWIFE

## 2022-12-19 PROCEDURE — 71271 CT CHEST LUNG SCREENING LOW DOSE: ICD-10-PCS | Mod: 26,,, | Performed by: RADIOLOGY

## 2022-12-19 PROCEDURE — 87624 HUMAN PAPILLOMAVIRUS (HPV): ICD-10-PCS | Mod: ,,, | Performed by: CLINICAL MEDICAL LABORATORY

## 2022-12-19 NOTE — PROGRESS NOTES
Subjective:       Patient ID: Mirian Gallegos is a 67 y.o. female.    Chief Complaint: Well Woman (Pt last pap was in 2019 or 2020. Denies any problems at this time.)    Denies complaints.  MMG scheduled in Jan Colonoscopy, Dr Hernandez.   Review of Systems   Constitutional: Negative.    HENT: Negative.     Eyes: Negative.    Respiratory: Negative.     Cardiovascular: Negative.    Gastrointestinal: Negative.    Endocrine: Negative.    Genitourinary: Negative.    Musculoskeletal: Negative.    Integumentary:  Negative.   Allergic/Immunologic: Negative.    Neurological: Negative.    Psychiatric/Behavioral: Negative.         Objective:      Physical Exam  Vitals reviewed.   Constitutional:       Appearance: Normal appearance.   HENT:      Head: Normocephalic.   Cardiovascular:      Rate and Rhythm: Normal rate and regular rhythm.   Pulmonary:      Effort: Pulmonary effort is normal.      Breath sounds: Normal breath sounds.   Chest:   Breasts:     Right: Normal. No mass.      Left: Normal. No mass.   Abdominal:      General: Abdomen is flat.      Palpations: Abdomen is soft.   Genitourinary:     General: Normal vulva.      Exam position: Lithotomy position.      Vagina: Normal.      Cervix: No cervical motion tenderness.      Uterus: Normal. Not tender.       Adnexa: Right adnexa normal and left adnexa normal.        Right: No mass.          Left: No mass.     Musculoskeletal:         General: Normal range of motion.      Cervical back: Normal range of motion.   Skin:     General: Skin is warm and dry.   Neurological:      Mental Status: She is alert and oriented to person, place, and time.   Psychiatric:         Mood and Affect: Mood normal.         Behavior: Behavior normal.       Assessment:       Problem List Items Addressed This Visit    None  Visit Diagnoses       Encounter for pelvic screening for cancer        Relevant Orders    ThinPrep Pap Test              Plan:       F/U in 1year or as needed.

## 2022-12-20 NOTE — PROGRESS NOTES
She has plaque noted in her coronary arteries.  I have recommended a statin in the past to help prevent stroke or MI, but she declined.  Changes of emphysema.  No worrisome nodule or signs of cancer.  Repeat annually.

## 2022-12-22 LAB
GH SERPL-MCNC: NORMAL NG/ML
INSULIN SERPL-ACNC: NORMAL U[IU]/ML
LAB AP CLINICAL INFORMATION: NORMAL
LAB AP GYN INTERPRETATION: NEGATIVE
LAB AP PAP DISCLAIMER COMMENTS: NORMAL
RENIN PLAS-CCNC: NORMAL NG/ML/H

## 2022-12-24 LAB
HPV 16: NEGATIVE
HPV 18: NEGATIVE
HPV OTHER: NEGATIVE

## 2023-01-04 ENCOUNTER — HOSPITAL ENCOUNTER (OUTPATIENT)
Dept: RADIOLOGY | Facility: HOSPITAL | Age: 68
Discharge: HOME | End: 2023-01-04
Attending: NURSE PRACTITIONER
Payer: MEDICARE

## 2023-01-04 DIAGNOSIS — Z12.31 BREAST CANCER SCREENING BY MAMMOGRAM: ICD-10-CM

## 2023-01-04 PROCEDURE — 77067 SCR MAMMO BI INCL CAD: CPT | Mod: TC

## 2023-05-09 DIAGNOSIS — Z71.89 COMPLEX CARE COORDINATION: ICD-10-CM

## 2023-05-10 ENCOUNTER — OFFICE VISIT (OUTPATIENT)
Dept: FAMILY MEDICINE | Facility: CLINIC | Age: 68
End: 2023-05-10
Payer: MEDICARE

## 2023-05-10 VITALS
BODY MASS INDEX: 23.54 KG/M2 | HEART RATE: 60 BPM | RESPIRATION RATE: 20 BRPM | WEIGHT: 150 LBS | DIASTOLIC BLOOD PRESSURE: 84 MMHG | SYSTOLIC BLOOD PRESSURE: 168 MMHG | OXYGEN SATURATION: 99 % | HEIGHT: 67 IN | TEMPERATURE: 98 F

## 2023-05-10 DIAGNOSIS — J43.2 CENTRILOBULAR EMPHYSEMA: Chronic | ICD-10-CM

## 2023-05-10 DIAGNOSIS — L23.7 ALLERGIC CONTACT DERMATITIS DUE TO PLANTS, EXCEPT FOOD: Primary | ICD-10-CM

## 2023-05-10 DIAGNOSIS — R03.0 ELEVATED BLOOD-PRESSURE READING WITHOUT DIAGNOSIS OF HYPERTENSION: ICD-10-CM

## 2023-05-10 PROBLEM — Z72.0 VAPES NICOTINE CONTAINING SUBSTANCE: Chronic | Status: ACTIVE | Noted: 2022-11-07

## 2023-05-10 PROBLEM — R53.83 MALAISE AND FATIGUE: Status: RESOLVED | Noted: 2022-12-08 | Resolved: 2023-05-10

## 2023-05-10 PROBLEM — J43.9 ACUTE EXACERBATION OF EMPHYSEMA: Status: RESOLVED | Noted: 2022-12-08 | Resolved: 2023-05-10

## 2023-05-10 PROBLEM — J43.9 EMPHYSEMA OF LUNG: Chronic | Status: RESOLVED | Noted: 2019-12-11 | Resolved: 2023-05-10

## 2023-05-10 PROBLEM — J22 LRTI (LOWER RESPIRATORY TRACT INFECTION): Status: RESOLVED | Noted: 2022-11-29 | Resolved: 2023-05-10

## 2023-05-10 PROBLEM — R05.1 ACUTE COUGH: Status: RESOLVED | Noted: 2022-12-08 | Resolved: 2023-05-10

## 2023-05-10 PROBLEM — R53.81 MALAISE AND FATIGUE: Status: RESOLVED | Noted: 2022-12-08 | Resolved: 2023-05-10

## 2023-05-10 PROBLEM — J44.1 ACUTE EXACERBATION OF EMPHYSEMA: Status: RESOLVED | Noted: 2022-12-08 | Resolved: 2023-05-10

## 2023-05-10 PROCEDURE — 96372 PR INJECTION,THERAP/PROPH/DIAG2ST, IM OR SUBCUT: ICD-10-PCS | Mod: ,,, | Performed by: NURSE PRACTITIONER

## 2023-05-10 PROCEDURE — 99213 PR OFFICE/OUTPT VISIT, EST, LEVL III, 20-29 MIN: ICD-10-PCS | Mod: ,,, | Performed by: NURSE PRACTITIONER

## 2023-05-10 PROCEDURE — 99213 OFFICE O/P EST LOW 20 MIN: CPT | Mod: ,,, | Performed by: NURSE PRACTITIONER

## 2023-05-10 PROCEDURE — 96372 THER/PROPH/DIAG INJ SC/IM: CPT | Mod: ,,, | Performed by: NURSE PRACTITIONER

## 2023-05-10 RX ORDER — METHYLPREDNISOLONE ACETATE 40 MG/ML
40 INJECTION, SUSPENSION INTRA-ARTICULAR; INTRALESIONAL; INTRAMUSCULAR; SOFT TISSUE
Status: COMPLETED | OUTPATIENT
Start: 2023-05-10 | End: 2023-05-10

## 2023-05-10 RX ORDER — DEXAMETHASONE SODIUM PHOSPHATE 4 MG/ML
4 INJECTION, SOLUTION INTRA-ARTICULAR; INTRALESIONAL; INTRAMUSCULAR; INTRAVENOUS; SOFT TISSUE
Status: COMPLETED | OUTPATIENT
Start: 2023-05-10 | End: 2023-05-10

## 2023-05-10 RX ADMIN — METHYLPREDNISOLONE ACETATE 40 MG: 40 INJECTION, SUSPENSION INTRA-ARTICULAR; INTRALESIONAL; INTRAMUSCULAR; SOFT TISSUE at 10:05

## 2023-05-10 RX ADMIN — DEXAMETHASONE SODIUM PHOSPHATE 4 MG: 4 INJECTION, SOLUTION INTRA-ARTICULAR; INTRALESIONAL; INTRAMUSCULAR; INTRAVENOUS; SOFT TISSUE at 10:05

## 2023-05-10 NOTE — PATIENT INSTRUCTIONS
Patient Education       DASH Diet   About this topic   DASH stands for Dietary Approaches to Stop Hypertension. The DASH diet may help you lower blood pressure. It may also help keep you from getting high blood pressure. You will eat less fat and more fiber on the DASH diet.  This diet gives you more minerals that fight high blood pressure. Some nutrients in this diet are:  Potassium ? Acts to help you get rid of salt. This may help to lower blood pressure.  Calcium ? Makes blood vessels and muscles work the right way  Magnesium - Helps blood vessels relax  Fiber ? Helps you feel full. It also helps digestion.  What will the results be?   The DASH diet may help you:  Lower your blood pressure and cholesterol  Lower your risk for cancer, heart disease, heart attack, and stroke. It may also lower your risk for heart failure, kidney stones, and diabetes.  Lose weight or keep a healthy weight  What lifestyle changes are needed?   Add regular exercise to get the most help from this diet.  Try to lower stress. Find ways to relax.  Stop smoking. Avoid secondhand smoke.  Limit alcohol intake.  What changes to diet are needed?   Know about poor eating habits. Then, you can fix them as you work with the program.  This diet encourages fruits and vegetables, whole grains, lean meats, healthy fats, and low-fat or fat-free dairy products.  This diet is lower in saturated fats, trans-fats, cholesterol, added sugars, and sodium.  Who should use this diet?   This eating plan is good for the whole family. It is also good for people with high blood pressure and those at risk for high blood pressure.  What foods are good to eat?   Grains: Try to eat 6 to 8 servings of whole grain, high fiber foods each day. These are bread, cereals, brown rice, or pasta.  Fruits and vegetables: Eat 4 to 5 servings each day. Try to pick many kinds and colors. Fresh or frozen are best. Look for low sodium or salt-free if you choose canned.  Dairy: Try to  eat 2 to 3 servings of fat free and low fat milk products each day.  Lean meats, poultry, and seafood: Try to eat 6 servings or less of lean meats, poultry, and seafood each day. Try to choose more low fat or lean meats like chicken and turkey. Eat less red meat. Eat more fish instead.  Nuts, seeds, and legumes (dry beans and peas): Try to eat 4 to 5 servings each week. Try to pick nuts such as almonds and walnuts, sunflower seeds, peanut butter, soy beans, lentils, kidney beans, and split peas.  Fats and oils: Try to eat 2 to 3 servings of fats and oils each day. Eat good fats found in fish, nuts, and avocados. Try using olive oil or vegetable oils such as canola oil. Other good oils to try are corn, safflower, sunflower, or soybean oils. Use low-sodium and low-fat salad dressing and mayonnaise.  Condiments: Pepper, herbs, spices, vinegar, lemon or lime juices are great for seasoning. Be careful to choose low-sodium or salt-free products if you use broths, soups, or soy sauce.  Sweets: Try to eat less than 5 servings each week. Choose low-fat and trans fat-free desserts. These are things like fruit flavored gelatin, sorbet, jelly beans, reanna crackers, animal crackers, low-fat fig bars, and rosanna snaps. Eat fruit to satisfy your desire for sweets.     What foods should be limited or avoided?   Grains: Salted breads, rolls, crackers, quick breads, self-rising flours, biscuit mixes, regular bread crumbs, instant hot cereals, commercially-prepared rice, pasta, stuffing mixes  Fruits and vegetables: Commercially-prepared potatoes and vegetable mixes, regular canned vegetables and juices, vegetables frozen with sauce or pickled vegetables, processed fruits with salt or sodium  Milk: Whole milk, malted milk, chocolate milk, buttermilk, cheese, ice cream  Meats and beans: Smoked, cured, salted, or canned fish; meats or poultry such as majano, sausages, sardines; high-fat cuts of meat like beef, lamb, or pork; chicken  with the skin on it  Fats: Cut back on solid fats like butter, lard, and margarine. Eat less food with high saturated fat, cholesterol and total fat.  Condiments and snacks: Salted and canned peas, beans, and olives; salted snack foods; fried foods; soda or other sweetened drinks  Sweets: High-fat baked goods such as muffins, donuts, pastries, commercial baked goods, candy bars  If you choose to drink alcohol, limit the amount you drink. Women should have 1 drink or less per day and men should have 2 drinks or less per day.  Helpful tips   Avoid eating canned vegetables and processed foods. These have a lot of salt in them. Look for a low-salt or low-sodium choice.  Try baking or broiling instead of frying food.  Write down the foods you eat. This will help you track what you have eaten each week.  When you go to a grocery store, have a list or a meal plan. Do not shop when you are hungry to avoid cravings for foods.  Read food labels with care. They will show you how much is in a serving. The amount is given as a percentage of the total amount you need each day. Reading labels will help you make healthy food choices.       Avoid fast foods.  Talk to your doctor or dietitian to see if you need vitamin and mineral supplements to help you balance your diet.  Talk to a dietitian for help.  Where can I learn more?   [employer]y [employer] Dietetics  https://www.eatright.org/health/wellness/heart-and-cardiovascular-health/dash-diet-reducing-hypertension-through-diet-and-lifestyle   FamilyDoctor.org  http://familydoctor.org/familydoctor/en/prevention-wellness/food-nutrition/weight-loss/the-dash-diet-healthy-eating-to-control-your-blood-pressure.html   Last Reviewed Date   2021-03-15  Consumer Information Use and Disclaimer   This information is not specific medical advice and does not replace information you receive from your health care provider. This is only a brief summary of general information. It does NOT include  all information about conditions, illnesses, injuries, tests, procedures, treatments, therapies, discharge instructions or life-style choices that may apply to you. You must talk with your health care provider for complete information about your health and treatment options. This information should not be used to decide whether or not to accept your health care providers advice, instructions or recommendations. Only your health care provider has the knowledge and training to provide advice that is right for you.  Copyright   Copyright © 2021 [employer] and its affiliates and/or licensors. All rights reserved.

## 2023-05-10 NOTE — PROGRESS NOTES
[facility] - FAMILY MEDICINE       PATIENT NAME: Mirian Gallegos   : 1955    AGE: 68 y.o. DATE OF ENCOUNTER: 5/10/23    MRN: [id number]      PCP: SENAIT Mendez    Reason for Visit / Chief Complaint:  Allergic Reaction (Patient presents to the clinic poison ivy since saturday)         274}    Subjective:     HPI:    Presents for itchy, uncomfortable rash to bilat arms from poison ivy since working in the yard.  Had on gloves but short sleeves.   Has been using triamcinolone ointment which has helped some but it is spreading and she needs a shot.    Does not have history of HTN though reports BP has been increasing over time.  Thinks BP is elevated today due to her discomfort and will monitor BP daily at home and let me know if running > 140/90 and if so will start med.    Review of Systems:   Review of Systems   Constitutional: Negative.    Respiratory: Negative.     Cardiovascular: Negative.    Skin:  Positive for rash.   Neurological: Negative.  Negative for dizziness and headaches.     Allergies and Meds: 274}   Review of patient's allergies indicates:  No Known Allergies     Current Outpatient Medications:     ASHWAGANDHA ROOT EXTRACT ORAL, Take 1 tablet by mouth Daily., Disp: , Rfl:     biotin 300 mcg Tab, Take 1 tablet by mouth once daily., Disp: , Rfl:     magnesium 250 mg Tab, Take 1 tablet by mouth once daily., Disp: , Rfl:     multivit with calcium,iron,min (WOMEN'S MULTIPLE VITAMINS ORAL), Take 1 tablet by mouth Daily., Disp: , Rfl:   No current facility-administered medications for this visit.    Medical History: 274}     Past Medical History:   Diagnosis Date    Colon cancer 2020    stage 2, Dr. Albaro Garcia - robotic assisted laparoscopic right hemicolectomy 2020    Emphysema of lung 2019    noted on LDCT    Hypercholesteremia 2019    declined statin    Statin declined       Social History     Tobacco Use   Smoking Status Every Day    Types:  "Vaping with nicotine   Smokeless Tobacco Never      Past Surgical History:   Procedure Laterality Date    OOPHORECTOMY      with preservation of uterus    RIGHT HEMICOLECTOMY  03/17/2020    robotic assisted laparoscopic per Dr. Albaro Garcia, [facility]      Objective:  274}   BP (!) 168/84 (BP Location: Right arm, Patient Position: Sitting, BP Method: Large (Manual))   Pulse 60   Temp 97.7 °F (36.5 °C) (Oral)   Resp 20   Ht 5' 7" (1.702 m)   Wt 68 kg (150 lb)   SpO2 99%   BMI 23.49 kg/m²     Wt Readings from Last 3 Encounters:   05/10/23 68 kg (150 lb)   12/19/22 63.5 kg (140 lb)   12/19/22 64.4 kg (142 lb)     BP Readings from Last 3 Encounters:   05/10/23 (!) 168/84   12/19/22 122/70   12/08/22 130/80     Body mass index is 23.49 kg/m².     Physical Exam  Vitals and nursing note reviewed.   Constitutional:       General: She is not in acute distress (appears uncomfortable, blotting rash on arms with a napkin).     Appearance: Normal appearance.   HENT:      Head: Normocephalic.   Eyes:      Conjunctiva/sclera: Conjunctivae normal.   Cardiovascular:      Rate and Rhythm: Normal rate and regular rhythm.      Heart sounds: Normal heart sounds.   Pulmonary:      Effort: Pulmonary effort is normal. No respiratory distress.      Breath sounds: Normal breath sounds.   Musculoskeletal:      Cervical back: Neck supple.   Skin:     General: Skin is warm and dry.      Findings: Rash (inflamed/erythematous vesicular rash in patches to bilateral upper extremities but most prominent to LUE) present.   Neurological:      Mental Status: She is alert and oriented to person, place, and time.        Assessment and Plan: 274}     1. Allergic contact dermatitis due to plants, except food  -     methylPREDNISolone acetate injection 40 mg  -     dexAMETHasone injection 4 mg    2. Centrilobular emphysema  Comments:  Noted per LDCT, stable, former cigarette smoker and now vapes with nicotine which she has been " advised to stop.  Overview:  noted on LDCT      3. Elevated blood-pressure reading without diagnosis of hypertension  Overview:  monitor BP daily at home and let me know if running > 140/90 and if so will start med         Advised steroid shot may cause BP elevation but rash is continuing to spread despite the use of triamcinolone.    Return to clinic if symptoms persist or worsen or for persistent BP elevation; and sooner as needed.    Future Appointments   Date Time Provider Department Center   1/10/2024  1:30 PM [facility]        Signature:  SENAIT Mendez

## 2023-05-18 ENCOUNTER — TELEPHONE (OUTPATIENT)
Dept: FAMILY MEDICINE | Facility: CLINIC | Age: 68
End: 2023-05-18
Payer: MEDICARE

## 2023-05-18 DIAGNOSIS — I10 ESSENTIAL HYPERTENSION: ICD-10-CM

## 2023-05-18 RX ORDER — LOSARTAN POTASSIUM 50 MG/1
50 TABLET ORAL DAILY
Qty: 90 TABLET | Refills: 0 | Status: SHIPPED | OUTPATIENT
Start: 2023-05-18 | End: 2023-08-07

## 2023-05-18 NOTE — TELEPHONE ENCOUNTER
Her BP is persistently elevated.  Call and advise her I have sent a prescription to start losartan 50 mg daily.  I did send a 90 day supply because if it is not lowered with 50mg we will double the dose.  Please have her schedule a 4-6 week f/u HTN and bring a BP log.

## 2023-05-18 NOTE — ASSESSMENT & PLAN NOTE
BP log 5/10-5/17/23:  163/97  146/89  159/94  150/86  176/105  154/87  160/87  155/93  167/103  135/86    Start losartan 50 mg daily.

## 2023-05-19 NOTE — TELEPHONE ENCOUNTER
Spoke with pt.  Instructed to start losartan 50 mg every day and keep blood pressure log until she comes back.  Appointment for 4-6 weeks made.

## 2023-07-24 ENCOUNTER — OFFICE VISIT (OUTPATIENT)
Dept: FAMILY MEDICINE | Facility: CLINIC | Age: 68
End: 2023-07-24
Payer: MEDICARE

## 2023-07-24 VITALS
RESPIRATION RATE: 20 BRPM | HEIGHT: 67 IN | TEMPERATURE: 98 F | SYSTOLIC BLOOD PRESSURE: 134 MMHG | DIASTOLIC BLOOD PRESSURE: 80 MMHG | OXYGEN SATURATION: 100 % | WEIGHT: 146 LBS | BODY MASS INDEX: 22.91 KG/M2 | HEART RATE: 60 BPM

## 2023-07-24 DIAGNOSIS — I10 ESSENTIAL HYPERTENSION: Primary | ICD-10-CM

## 2023-07-24 DIAGNOSIS — B35.1 TOENAIL FUNGUS: ICD-10-CM

## 2023-07-24 PROCEDURE — 99213 PR OFFICE/OUTPT VISIT, EST, LEVL III, 20-29 MIN: ICD-10-PCS | Mod: ,,, | Performed by: NURSE PRACTITIONER

## 2023-07-24 PROCEDURE — 99213 OFFICE O/P EST LOW 20 MIN: CPT | Mod: ,,, | Performed by: NURSE PRACTITIONER

## 2023-07-24 NOTE — ASSESSMENT & PLAN NOTE
7/24/23: Increase losartan 50 mg to tablet every am and 1/2 tablet every pm.  Continue home BP monitoring. Goal is consistent SBP < 130 & DBP < 80.

## 2023-07-24 NOTE — PROGRESS NOTES
[facility] - FAMILY MEDICINE       PATIENT NAME: Mirian Gallegos   : 1955    AGE: 68 y.o. DATE OF ENCOUNTER: 23    MRN: [id number]      PCP: SENAIT Mendez    Reason for Visit / Chief Complaint:  Hypertension and Follow-up (Patient presents to clinic for 6 weeks follow up of HTN)         274}    Subjective:     HPI:    Presents for 6 wk f/u HTN since starting losartan 50 mg.  Has home BP log with frequent SBP > 130 & DBP > 80 (will have log scanned in) - -162/78-98.    Still vaping, cooking w/ more salt than in past, drinks a lot of caffeine.     C/o gets a fungal infection to bilateral great toenails every summer, treats with topical, goes away in the winter.  Asking about alternative treatments.  Currently using generic [facility] topical antifungal.    Review of Systems:   Review of Systems   Constitutional: Negative.    Cardiovascular: Negative.    Neurological: Negative.    Psychiatric/Behavioral: Negative.       Allergies and Meds: 274}   Review of patient's allergies indicates:  No Known Allergies     Current Outpatient Medications:     ASHWAGANDHA ROOT EXTRACT ORAL, Take 1 tablet by mouth Daily., Disp: , Rfl:     biotin 300 mcg Tab, Take 1 tablet by mouth once daily., Disp: , Rfl:     losartan (COZAAR) 50 MG tablet, Take 1 tablet (50 mg total) by mouth once daily., Disp: 90 tablet, Rfl: 0    magnesium 250 mg Tab, Take 1 tablet by mouth once daily., Disp: , Rfl:     multivit with calcium,iron,min (WOMEN'S MULTIPLE VITAMINS ORAL), Take 1 tablet by mouth Daily., Disp: , Rfl:     Labs:274}   I have reviewed labs below:  Lab Results   Component Value Date    WBC 6.10 2022    RBC 4.37 2022    HGB 12.6 2022    HCT 39.9 2022     2022     2022    K 4.1 2022     2022    CALCIUM 9.4 2022    GLU 88 2022    BUN 13 2022    CREATININE 0.90 2022    ALT 31 2022    AST 21 2022     "CHOL 225 (H) 11/07/2022    TRIG 87 11/07/2022    HDL 72 (H) 11/07/2022    LDLCALC 136 11/07/2022       Medical History: 274}     Past Medical History:   Diagnosis Date    Colon cancer 02/18/2020    stage 2, Dr. Albaro Garcia - robotic assisted laparoscopic right hemicolectomy 03/17/2020    Emphysema of lung 12/11/2019    noted on LDCT    Hypercholesteremia 11/11/2019    declined statin    Statin declined       Social History     Tobacco Use   Smoking Status Every Day    Types: Vaping with nicotine   Smokeless Tobacco Never      Past Surgical History:   Procedure Laterality Date    OOPHORECTOMY      with preservation of uterus    RIGHT HEMICOLECTOMY  03/17/2020    robotic assisted laparoscopic per Dr. Albaro Garcia, [facility]        Health Maintenance: 274}     Health Maintenance         Date Due Completion Date    TETANUS VACCINE 11/07/2023 (Originally 1/18/1971) ---    Shingles Vaccine (1 of 2) 11/07/2023 (Originally 1/18/2003) ---    DEXA Scan 11/07/2023 (Originally 1/18/1993) ---    Influenza Vaccine (1) 09/01/2023 11/7/2022    LDCT Lung Screen 12/19/2023 12/19/2022    Override on 12/11/2019: Done (Scanned into documents.)    Mammogram 01/04/2024 1/4/2023    Override on 12/11/2019: Done (Negative. Repeat yearly. Scanned into documents.)    Colorectal Cancer Screening 03/10/2026 3/10/2021    Override on 2/18/2020: Done (Dr. Hernandez. Scanned into documents.)    Override on 2/18/2020: Done (Dr. Hernandez. Repeat in 5 years. Scanned into documents.)    Override on 11/18/2019: Done (Positive. Scanned into documents.)    Lipid Panel 11/07/2027 11/7/2022            Objective:  274}   /80 (BP Method: Medium (Automatic))   Pulse 60   Temp 97.9 °F (36.6 °C) (Oral)   Resp 20   Ht 5' 7" (1.702 m)   Wt 66.2 kg (146 lb)   SpO2 100%   BMI 22.87 kg/m²     Wt Readings from Last 3 Encounters:   07/24/23 66.2 kg (146 lb)   05/10/23 68 kg (150 lb)   12/19/22 63.5 kg (140 lb)     BP Readings from Last 3 " Encounters:   07/24/23 134/80   05/10/23 (!) 168/84   12/19/22 122/70     Body mass index is 22.87 kg/m².     Physical Exam  Vitals and nursing note reviewed.   Constitutional:       General: She is not in acute distress.     Appearance: Normal appearance. She is not ill-appearing.   HENT:      Head: Normocephalic.   Eyes:      Conjunctiva/sclera: Conjunctivae normal.   Cardiovascular:      Rate and Rhythm: Normal rate and regular rhythm.      Heart sounds: Normal heart sounds.   Pulmonary:      Effort: Pulmonary effort is normal. No respiratory distress.      Breath sounds: Normal breath sounds.   Musculoskeletal:      Cervical back: Neck supple.   Feet:      Comments: Approximately 1/3 or less involvement bilateral great toenail with whitish discoloration.  Skin:     General: Skin is warm and dry.   Neurological:      Mental Status: She is alert and oriented to person, place, and time.        Assessment and Plan: 274}     1. Essential hypertension  Overview:  5/18/23 start losartan 50 mg daily and continue to monitor BP daily at home    Assessment & Plan:  7/24/23: Increase losartan 50 mg to tablet every am and 1/2 tablet every pm.  Continue home BP monitoring. Goal is consistent SBP < 130 & DBP < 80.      2. Toenail fungus  Comments:  Recommended OTC Kerasal to bilat great toenails 1-2x/day x 3-6 mths.       Return to clinic 4 wks f/u uncontrolled HTN, bring BP log; and sooner as needed.    Future Appointments   Date Time Provider Department Center   1/10/2024  1:30 PM [facility]        Signature:  SENAIT Mendez

## 2023-08-22 ENCOUNTER — OFFICE VISIT (OUTPATIENT)
Dept: FAMILY MEDICINE | Facility: CLINIC | Age: 68
End: 2023-08-22
Payer: MEDICARE

## 2023-08-22 VITALS
WEIGHT: 144.63 LBS | TEMPERATURE: 98 F | BODY MASS INDEX: 22.7 KG/M2 | HEIGHT: 67 IN | RESPIRATION RATE: 20 BRPM | DIASTOLIC BLOOD PRESSURE: 72 MMHG | OXYGEN SATURATION: 98 % | SYSTOLIC BLOOD PRESSURE: 127 MMHG | HEART RATE: 53 BPM

## 2023-08-22 DIAGNOSIS — I10 ESSENTIAL HYPERTENSION: Primary | Chronic | ICD-10-CM

## 2023-08-22 PROCEDURE — 99213 OFFICE O/P EST LOW 20 MIN: CPT | Mod: ,,, | Performed by: NURSE PRACTITIONER

## 2023-08-22 PROCEDURE — 99213 PR OFFICE/OUTPT VISIT, EST, LEVL III, 20-29 MIN: ICD-10-PCS | Mod: ,,, | Performed by: NURSE PRACTITIONER

## 2023-08-22 NOTE — PROGRESS NOTES
[facility] - FAMILY MEDICINE       PATIENT NAME: Mirian Gallegos   : 1955    AGE: 68 y.o. DATE OF ENCOUNTER: 23    MRN: [id number]      PCP: Tiffany Ray FNP    Reason for Visit / Chief Complaint:  Hypertension and Follow-up (Patient presents to clinic for 4 week follow up of HTN)         274}    Subjective:     HPI:    Presents for 4 week f/u uncontrolled HTN.  Losartan was increased to 50 mg every morning and 25 mg every evening at last visit.  BP is improved upon review of home BP log (to be scanned in).    Review of Systems:   Review of Systems   Constitutional: Negative.    HENT: Negative.     Respiratory: Negative.     Cardiovascular: Negative.    Gastrointestinal: Negative.    Skin: Negative.    Neurological: Negative.    Psychiatric/Behavioral: Negative.         Allergies and Meds: 274}   Review of patient's allergies indicates:  No Known Allergies     Current Outpatient Medications:     ASHWAGANDHA ROOT EXTRACT ORAL, Take 1 tablet by mouth Daily., Disp: , Rfl:     biotin 300 mcg Tab, Take 1 tablet by mouth once daily., Disp: , Rfl:     losartan (COZAAR) 50 MG tablet, Take 50 mg by mouth every morning and 25 mg every evening for HTN., Disp: 135 tablet, Rfl: 0    magnesium 250 mg Tab, Take 1 tablet by mouth once daily., Disp: , Rfl:     multivit with calcium,iron,min (WOMEN'S MULTIPLE VITAMINS ORAL), Take 1 tablet by mouth Daily., Disp: , Rfl:     Labs:274}   I have reviewed labs below:  Lab Results   Component Value Date    WBC 6.10 2022    RBC 4.37 2022    HGB 12.6 2022    HCT 39.9 2022     2022     2022    K 4.1 2022     2022    CALCIUM 9.4 2022    GLU 88 2022    BUN 13 2022    CREATININE 0.90 2022    ALT 31 2022    AST 21 2022    CHOL 225 (H) 2022    TRIG 87 2022    HDL 72 (H) 2022    LDLCALC 136 2022       Medical History: 274}     Past  "Medical History:   Diagnosis Date    Colon cancer 02/18/2020    stage 2, Dr. Albaro Garcia - robotic assisted laparoscopic right hemicolectomy 03/17/2020    Emphysema of lung 12/11/2019    noted on LDCT    Hypercholesteremia 11/11/2019    declined statin    Statin declined       Social History     Tobacco Use   Smoking Status Every Day    Current packs/day: 0.00    Types: Vaping with nicotine   Smokeless Tobacco Never      Past Surgical History:   Procedure Laterality Date    OOPHORECTOMY      with preservation of uterus    RIGHT HEMICOLECTOMY  03/17/2020    robotic assisted laparoscopic per Dr. Albaro Garcia, [facility]        Health Maintenance: 274}     Health Maintenance         Date Due Completion Date    TETANUS VACCINE 11/07/2023 (Originally 1/18/1971) ---    Shingles Vaccine (1 of 2) 11/07/2023 (Originally 1/18/2003) ---    DEXA Scan 11/07/2023 (Originally 1/18/1993) ---    Influenza Vaccine (1) 09/01/2023 11/7/2022    LDCT Lung Screen 12/19/2023 12/19/2022    Override on 12/11/2019: Done (Scanned into documents.)    Mammogram 01/04/2024 1/4/2023    Override on 12/11/2019: Done (Negative. Repeat yearly. Scanned into documents.)    Colorectal Cancer Screening 03/10/2026 3/10/2021    Override on 2/18/2020: Done (Dr. Hernandez. Scanned into documents.)    Override on 2/18/2020: Done (Dr. Hernandez. Repeat in 5 years. Scanned into documents.)    Override on 11/18/2019: Done (Positive. Scanned into documents.)    Lipid Panel 11/07/2027 11/7/2022            Objective:  274}   /72 (BP Location: Right arm, Patient Position: Sitting, BP Method: Large (Automatic))   Pulse (!) 53   Temp 98 °F (36.7 °C) (Oral)   Resp 20   Ht 5' 7" (1.702 m)   Wt 65.6 kg (144 lb 9.6 oz)   SpO2 98%   BMI 22.65 kg/m²     Wt Readings from Last 3 Encounters:   08/22/23 65.6 kg (144 lb 9.6 oz)   07/24/23 66.2 kg (146 lb)   05/10/23 68 kg (150 lb)     BP Readings from Last 3 Encounters:   08/22/23 127/72   07/24/23 " 134/80   05/10/23 (!) 168/84     Body mass index is 22.65 kg/m².     Physical Exam  Vitals and nursing note reviewed.   Constitutional:       General: She is not in acute distress.     Appearance: Normal appearance. She is not ill-appearing.   Cardiovascular:      Rate and Rhythm: Normal rate and regular rhythm.      Heart sounds: Normal heart sounds.   Pulmonary:      Effort: Pulmonary effort is normal. No respiratory distress.      Breath sounds: Normal breath sounds.   Skin:     General: Skin is warm and dry.   Neurological:      Mental Status: She is alert and oriented to person, place, and time.          Assessment and Plan: 274}     1. Essential hypertension  Comments:  Well controlled  Continue losartan 50 mg every morning and 25 mg every evening.  Continue home monitoring w/ goal < 130/80 consistently.  Overview:  5/18/23 started losartan 50 mg daily         Return to clinic 6-mth for HTN; and sooner as needed.    Future Appointments   Date Time Provider Department Center   1/10/2024  1:30 PM [facility]   2/22/2024 11:00 AM Tiffany Ray FNP [facility]        Signature:  SENAIT Mendez

## 2023-10-09 LAB
LEFT EYE DM RETINOPATHY: NEGATIVE
RIGHT EYE DM RETINOPATHY: NEGATIVE

## 2023-10-10 ENCOUNTER — TELEPHONE (OUTPATIENT)
Dept: FAMILY MEDICINE | Facility: CLINIC | Age: 68
End: 2023-10-10
Payer: MEDICARE

## 2023-10-10 DIAGNOSIS — I70.90 ATHEROSCLEROSIS OF ARTERY: Chronic | ICD-10-CM

## 2023-10-10 DIAGNOSIS — Z79.899 ENCOUNTER FOR LONG-TERM (CURRENT) USE OF OTHER MEDICATIONS: ICD-10-CM

## 2023-10-10 DIAGNOSIS — E78.00 HYPERCHOLESTEREMIA: Primary | Chronic | ICD-10-CM

## 2023-10-10 RX ORDER — ROSUVASTATIN CALCIUM 10 MG/1
10 TABLET, COATED ORAL DAILY
Qty: 90 TABLET | Refills: 3 | Status: SHIPPED | OUTPATIENT
Start: 2023-10-10 | End: 2024-10-09

## 2023-10-10 NOTE — TELEPHONE ENCOUNTER
Patient states she is ready to start cholesterol medication after having xray showing plaque build up.

## 2023-10-10 NOTE — TELEPHONE ENCOUNTER
Floridalma Borja, SIMA   12/20/2022  1:23 PM CST       Patient notified of results and states she will come in and take about starting a statin drug after Holidays    SENAIT Mendez   12/19/2022  8:47 PM CST       She has plaque noted in her coronary arteries.  I have recommended a statin in the past to help prevent stroke or MI, but she declined.  Changes of emphysema.  No worrisome nodule or signs of cancer.  Repeat annually.

## 2023-10-10 NOTE — TELEPHONE ENCOUNTER
Rx sent start rosuvastatin 10 mg daily.  Lab only orders entered to check lipids and metabolic panel around January 10th.

## 2023-11-02 ENCOUNTER — PATIENT OUTREACH (OUTPATIENT)
Dept: ADMINISTRATIVE | Facility: HOSPITAL | Age: 68
End: 2023-11-02

## 2023-11-02 NOTE — PROGRESS NOTES
Population Health Chart Review & Patient Outreach Details    Updates Requested / Reviewed:  [x]  Care Team Updated    Health Maintenance Topics Addressed and Outreach Outcomes / Actions Taken:          Diabetic Eye Exam [x] HM Updated with October 2023 Eye Exam (Dr. Goldberg). Care Team Updated.                                        Colorectal Cancer Screening [x]  Updated with February 2020 Colonoscopy (Dr. Hernandez). Care Team and Medical History Updated.

## 2023-12-09 DIAGNOSIS — Z71.89 COMPLEX CARE COORDINATION: ICD-10-CM

## 2023-12-12 ENCOUNTER — TELEPHONE (OUTPATIENT)
Dept: FAMILY MEDICINE | Facility: CLINIC | Age: 68
End: 2023-12-12
Payer: MEDICARE

## 2023-12-12 DIAGNOSIS — Z87.891 FORMER HEAVY CIGARETTE SMOKER (20-39 PER DAY): Chronic | ICD-10-CM

## 2023-12-12 DIAGNOSIS — Z12.2 ENCOUNTER FOR SCREENING FOR MALIGNANT NEOPLASM OF LUNG IN CURRENT SMOKER WITH 30 PACK YEAR HISTORY OR GREATER: Primary | ICD-10-CM

## 2023-12-12 DIAGNOSIS — F17.200 ENCOUNTER FOR SCREENING FOR MALIGNANT NEOPLASM OF LUNG IN CURRENT SMOKER WITH 30 PACK YEAR HISTORY OR GREATER: Primary | ICD-10-CM

## 2023-12-12 DIAGNOSIS — Z72.0 VAPES NICOTINE CONTAINING SUBSTANCE: Chronic | ICD-10-CM

## 2023-12-12 NOTE — TELEPHONE ENCOUNTER
----- Message from Lucita Coley sent at 12/12/2023 11:08 AM CST -----  WANTS TO TALK ABOUT GETTING A CT SCAN.... PT [phone]

## 2023-12-13 NOTE — TELEPHONE ENCOUNTER
Tobacco Use: High Risk (11/2/2023)    Patient History     Smoking Tobacco Use: Every Day     Smokeless Tobacco Use: Never     Passive Exposure: Not on file     Smoked at least 1 ppd x 50 years = 50 year pack history.  Quit smoking cigarettes 1/17/21, still vaping w/ nicotine daily    I entered the order.  It can't be scheduled until after 12/19/23 because her last was 12/19/22.

## 2023-12-29 ENCOUNTER — HOSPITAL ENCOUNTER (OUTPATIENT)
Dept: RADIOLOGY | Facility: HOSPITAL | Age: 68
Discharge: HOME | End: 2023-12-29
Attending: NURSE PRACTITIONER
Payer: MEDICARE

## 2023-12-29 VITALS — HEIGHT: 67 IN | WEIGHT: 145 LBS | BODY MASS INDEX: 22.76 KG/M2

## 2023-12-29 DIAGNOSIS — F17.200 ENCOUNTER FOR SCREENING FOR MALIGNANT NEOPLASM OF LUNG IN CURRENT SMOKER WITH 30 PACK YEAR HISTORY OR GREATER: ICD-10-CM

## 2023-12-29 DIAGNOSIS — Z87.891 FORMER HEAVY CIGARETTE SMOKER (20-39 PER DAY): Chronic | ICD-10-CM

## 2023-12-29 DIAGNOSIS — Z72.0 VAPES NICOTINE CONTAINING SUBSTANCE: Chronic | ICD-10-CM

## 2023-12-29 DIAGNOSIS — Z12.2 ENCOUNTER FOR SCREENING FOR MALIGNANT NEOPLASM OF LUNG IN CURRENT SMOKER WITH 30 PACK YEAR HISTORY OR GREATER: ICD-10-CM

## 2023-12-29 PROCEDURE — 71271 CT CHEST LUNG SCREENING LOW DOSE: ICD-10-PCS | Mod: 26,,, | Performed by: RADIOLOGY

## 2023-12-29 PROCEDURE — 71271 CT THORAX LUNG CANCER SCR C-: CPT | Mod: TC

## 2023-12-29 PROCEDURE — 71271 CT THORAX LUNG CANCER SCR C-: CPT | Mod: 26,,, | Performed by: RADIOLOGY

## 2024-01-02 NOTE — PROGRESS NOTES
Call pt and review results. No abnormal nodules or findings suspicious for lung CA.  She does have evidence of resolving bronchiolitis which is viral lung infection (like bronchitis).  Again noted atherosclerotic changes so continue rosuvastatin to prevent MI or CVA.  Degenerative/age related changes of spine.

## 2024-01-02 NOTE — PROGRESS NOTES
She already has a mammogram scheduled for January 10, 2024 but with the new way they are doing scheduling I can not tell what time it is booked for.

## 2024-01-02 NOTE — PROGRESS NOTES
This message didn't specify that she had been notified but since the patient was the one asking about mammo order you can close this if she has already been notified of the appt date/time.

## 2024-01-10 ENCOUNTER — HOSPITAL ENCOUNTER (OUTPATIENT)
Dept: RADIOLOGY | Facility: HOSPITAL | Age: 69
Discharge: HOME | End: 2024-01-10
Attending: NURSE PRACTITIONER
Payer: MEDICARE

## 2024-01-10 VITALS — BODY MASS INDEX: 21.97 KG/M2 | WEIGHT: 140 LBS | HEIGHT: 67 IN

## 2024-01-10 DIAGNOSIS — Z12.31 BREAST CANCER SCREENING BY MAMMOGRAM: ICD-10-CM

## 2024-01-10 PROCEDURE — 77067 SCR MAMMO BI INCL CAD: CPT | Mod: TC

## 2024-01-11 ENCOUNTER — DOCUMENTATION ONLY (OUTPATIENT)
Dept: RADIOLOGY | Facility: HOSPITAL | Age: 69
End: 2024-01-11
Payer: MEDICARE

## 2024-02-01 DIAGNOSIS — I10 ESSENTIAL HYPERTENSION: ICD-10-CM

## 2024-02-01 RX ORDER — LOSARTAN POTASSIUM 50 MG/1
TABLET ORAL
Qty: 135 TABLET | Refills: 0 | Status: SHIPPED | OUTPATIENT
Start: 2024-02-01 | End: 2024-03-12 | Stop reason: SDUPTHER

## 2024-03-12 ENCOUNTER — OFFICE VISIT (OUTPATIENT)
Dept: FAMILY MEDICINE | Facility: CLINIC | Age: 69
End: 2024-03-12
Payer: MEDICARE

## 2024-03-12 VITALS
WEIGHT: 144 LBS | SYSTOLIC BLOOD PRESSURE: 115 MMHG | RESPIRATION RATE: 20 BRPM | BODY MASS INDEX: 22.6 KG/M2 | TEMPERATURE: 98 F | DIASTOLIC BLOOD PRESSURE: 72 MMHG | HEART RATE: 50 BPM | HEIGHT: 67 IN | OXYGEN SATURATION: 100 %

## 2024-03-12 DIAGNOSIS — J43.9 PULMONARY EMPHYSEMA, UNSPECIFIED EMPHYSEMA TYPE: Chronic | ICD-10-CM

## 2024-03-12 DIAGNOSIS — I10 ESSENTIAL HYPERTENSION: Primary | Chronic | ICD-10-CM

## 2024-03-12 DIAGNOSIS — I70.90 ATHEROSCLEROSIS OF ARTERY: Chronic | ICD-10-CM

## 2024-03-12 DIAGNOSIS — Z79.899 ENCOUNTER FOR LONG-TERM (CURRENT) USE OF OTHER MEDICATIONS: ICD-10-CM

## 2024-03-12 DIAGNOSIS — E78.00 HYPERCHOLESTEREMIA: Chronic | ICD-10-CM

## 2024-03-12 PROBLEM — B35.1 TOENAIL FUNGUS: Status: RESOLVED | Noted: 2023-07-24 | Resolved: 2024-03-12

## 2024-03-12 LAB
ALBUMIN SERPL BCP-MCNC: 3.9 G/DL (ref 3.5–5)
ALBUMIN/GLOB SERPL: 1.3 {RATIO}
ALP SERPL-CCNC: 89 U/L (ref 55–142)
ALT SERPL W P-5'-P-CCNC: 28 U/L (ref 13–56)
ANION GAP SERPL CALCULATED.3IONS-SCNC: 9 MMOL/L (ref 7–16)
AST SERPL W P-5'-P-CCNC: 30 U/L (ref 15–37)
BILIRUB SERPL-MCNC: 2 MG/DL (ref ?–1.2)
BUN SERPL-MCNC: 14 MG/DL (ref 7–18)
BUN/CREAT SERPL: 17 (ref 6–20)
CALCIUM SERPL-MCNC: 9.2 MG/DL (ref 8.5–10.1)
CHLORIDE SERPL-SCNC: 108 MMOL/L (ref 98–107)
CHOLEST SERPL-MCNC: 155 MG/DL (ref 0–200)
CHOLEST/HDLC SERPL: 2.4 {RATIO}
CO2 SERPL-SCNC: 29 MMOL/L (ref 21–32)
CREAT SERPL-MCNC: 0.82 MG/DL (ref 0.55–1.02)
EGFR (NO RACE VARIABLE) (RUSH/TITUS): 77 ML/MIN/1.73M2
GLOBULIN SER-MCNC: 3.1 G/DL (ref 2–4)
GLUCOSE SERPL-MCNC: 79 MG/DL (ref 74–106)
HDLC SERPL-MCNC: 64 MG/DL (ref 40–60)
LDLC SERPL CALC-MCNC: 82 MG/DL
LDLC/HDLC SERPL: 1.3 {RATIO}
NONHDLC SERPL-MCNC: 91 MG/DL
POTASSIUM SERPL-SCNC: 4 MMOL/L (ref 3.5–5.1)
PROT SERPL-MCNC: 7 G/DL (ref 6.4–8.2)
SODIUM SERPL-SCNC: 142 MMOL/L (ref 136–145)
TRIGL SERPL-MCNC: 44 MG/DL (ref 35–150)
TSH SERPL DL<=0.005 MIU/L-ACNC: 2.94 UIU/ML (ref 0.36–3.74)
VLDLC SERPL-MCNC: 9 MG/DL

## 2024-03-12 PROCEDURE — 84443 ASSAY THYROID STIM HORMONE: CPT | Mod: ,,, | Performed by: CLINICAL MEDICAL LABORATORY

## 2024-03-12 PROCEDURE — 80053 COMPREHEN METABOLIC PANEL: CPT | Mod: ,,, | Performed by: CLINICAL MEDICAL LABORATORY

## 2024-03-12 PROCEDURE — 80061 LIPID PANEL: CPT | Mod: ,,, | Performed by: CLINICAL MEDICAL LABORATORY

## 2024-03-12 PROCEDURE — 99214 OFFICE O/P EST MOD 30 MIN: CPT | Mod: ,,, | Performed by: NURSE PRACTITIONER

## 2024-03-12 PROCEDURE — 83036 HEMOGLOBIN GLYCOSYLATED A1C: CPT | Mod: ,,, | Performed by: CLINICAL MEDICAL LABORATORY

## 2024-03-12 RX ORDER — EPINEPHRINE 0.22MG
100 AEROSOL WITH ADAPTER (ML) INHALATION DAILY
COMMUNITY

## 2024-03-12 RX ORDER — LOSARTAN POTASSIUM 50 MG/1
TABLET ORAL
Qty: 135 TABLET | Refills: 1 | Status: SHIPPED | OUTPATIENT
Start: 2024-05-02

## 2024-03-12 NOTE — ASSESSMENT & PLAN NOTE
Noted on LDCT and per patient chiropractor noted on x-rays at his office.  Agreed to start rosuvastatin and is tolerating without problems.  Recheck lipids today.

## 2024-03-12 NOTE — PROGRESS NOTES
[facility] - FAM[facility]IC[facility]       PATIENT NAME: Mirian Gallegos   : 1955    AGE: 69 y.o. DATE OF ENCOUNTER: 3/12/24    MRN: [id number]      PCP: Tiffany Ray FNP    Reason for Visit / Chief Complaint:  Hypertension and Follow-up (Patient presents to clinic for 6m f/u of htn.)         274}    Subjective:     HPI:    Presents for 6 mth f/u HTN.  Doing well, no complaints.  Would like to monitor effectiveness of statin which she started several months ago.  Taking BP every am: 130/81  113/72  124/71  128/76  117/68  114/67  123/76  132/76  125/75  125/73    Review of Systems:   Review of Systems   Constitutional: Negative.    HENT: Negative.     Respiratory: Negative.     Cardiovascular: Negative.    Gastrointestinal: Negative.    Skin: Negative.    Neurological: Negative.    Psychiatric/Behavioral: Negative.         Allergies and Meds: 274}   Review of patient's allergies indicates:  No Known Allergies     Current Outpatient Medications:     ASHWAGANDHA ROOT EXTRACT ORAL, Take 1 tablet by mouth Daily., Disp: , Rfl:     biotin 300 mcg Tab, Take 1 tablet by mouth once daily., Disp: , Rfl:     coenzyme Q10 (COQ-10) 100 mg capsule, Take 100 mg by mouth once daily., Disp: , Rfl:     magnesium 250 mg Tab, Take 1 tablet by mouth once daily., Disp: , Rfl:     multivit with calcium,iron,min (WOMEN'S MULTIPLE VITAMINS ORAL), Take 1 tablet by mouth Daily., Disp: , Rfl:     rosuvastatin (CRESTOR) 10 MG tablet, Take 1 tablet (10 mg total) by mouth once daily., Disp: 90 tablet, Rfl: 3    [START ON 2024] losartan (COZAAR) 50 MG tablet, TAKE 1 TABLET BY MOUTH ONCE DAILY IN THE MORNING AND 1/2 (ONE-HALF) ONCE DAILY IN THE EVENING FOR BLOOD PRESSURE, Disp: 135 tablet, Rfl: 1    Labs:274}   I have reviewed labs below:  Lab Results   Component Value Date    WBC 6.10 2022    RBC 4.37 2022    HGB 12.6 2022    HCT 39.9 2022     2022     2022    K 4.1  2022     2022    CALCIUM 9.4 2022    GLU 88 2022    BUN 13 2022    CREATININE 0.90 2022    ALT 31 2022    AST 21 2022    CHOL 225 (H) 2022    TRIG 87 2022    HDL 72 (H) 2022    LDLCALC 136 2022       Medical History: 274}     Past Medical History:   Diagnosis Date    Colon cancer 2020    stage 2, Dr. Albaro Garcia - robotic assisted laparoscopic right hemicolectomy 2020    Emphysema of lung 2019    noted on LDCT    Hypercholesteremia 2019    declined statin    Personal history of colonic polyps 2020    Statin declined     Tubular adenoma of colon 2020    Mid Transverse (Fragments)    Tubulovillous adenoma 2020    Multiple Pieces - Proximal Descending Colon      Social History     Tobacco Use   Smoking Status Former    Current packs/day: 0.00    Average packs/day: 1 pack/day for 52.6 years (52.6 ttl pk-yrs)    Types: Cigarettes, Vaping with nicotine    Start date: 1971    Quit date: 2023    Years since quittin.2   Smokeless Tobacco Never   Tobacco Comments    Smoked at least 1 ppd x 50 years = 50 year pack history.    Quit smoking cigarettes 21, still vaping w/ nicotine daily      Past Surgical History:   Procedure Laterality Date    COLONOSCOPY  2020    Dr. Jorge A Hernandez MD [facility]    OOPHORECTOMY      with preservation of uterus    RIGHT HEMICOLECTOMY  2020    robotic assisted laparoscopic per Dr. Albaro Garcia, [facility]        Health Maintenance: 274}     Health Maintenance         Date Due Completion Date    TETANUS VACCINE Never done ---    DEXA Scan Never done ---    Shingles Vaccine (1 of 2) Never done ---    RSV Vaccine (Age 60+ and Pregnant patients) (1 - 1-dose 60+ series) Never done ---    High Dose Statin 10/10/2024 10/10/2023    LDCT Lung Screen 2024    Override on 2019: Done (Scanned into  "documents.)    Mammogram 01/10/2025 1/10/2024    Override on 12/11/2019: Done (Negative. Repeat yearly. Scanned into documents.)    Colorectal Cancer Screening 03/10/2026 3/10/2021    Override on 11/18/2019: Done (Positive. Scanned into documents.)    Lipid Panel 11/07/2027 11/7/2022          Immunization History   Administered Date(s) Administered    COVID-19, MRNA, LN-S, PF (MODERNA FULL 0.5 ML DOSE) 02/26/2021, 03/30/2021, 10/12/2021    Influenza (FLUAD) - Quadrivalent - Adjuvanted - PF *Preferred* (65+) 11/07/2022, 10/10/2023    Influenza - High Dose - PF (65 years and older) 11/10/2020    Influenza - Quadrivalent - High Dose - PF (65 years and older) 11/02/2021    Pneumococcal Conjugate - 13 Valent 11/11/2019    Pneumococcal Polysaccharide - 23 Valent 11/10/2020     Objective:  274}   /72 (BP Location: Left arm, Patient Position: Sitting, BP Method: Medium (Automatic))   Pulse (!) 50   Temp 97.6 °F (36.4 °C) (Oral)   Resp 20   Ht 5' 7" (1.702 m)   Wt 65.3 kg (144 lb)   SpO2 100%   BMI 22.55 kg/m²     Wt Readings from Last 3 Encounters:   03/12/24 65.3 kg (144 lb)   01/10/24 63.5 kg (140 lb)   12/29/23 65.8 kg (145 lb)     BP Readings from Last 3 Encounters:   03/12/24 115/72   08/22/23 127/72   07/24/23 134/80     Body mass index is 22.55 kg/m².     Physical Exam  Vitals and nursing note reviewed.   Constitutional:       General: She is not in acute distress.     Appearance: Normal appearance.   HENT:      Head: Normocephalic.      Right Ear: Tympanic membrane, ear canal and external ear normal.      Left Ear: Tympanic membrane, ear canal and external ear normal.      Nose: Nose normal.      Mouth/Throat:      Mouth: Mucous membranes are moist.      Pharynx: Oropharynx is clear. Uvula midline. No posterior oropharyngeal erythema or uvula swelling.   Eyes:      Conjunctiva/sclera: Conjunctivae normal.      Pupils: Pupils are equal, round, and reactive to light.   Neck:      Thyroid: No thyroid mass " or thyromegaly.      Vascular: Normal carotid pulses. No carotid bruit.   Cardiovascular:      Rate and Rhythm: Normal rate and regular rhythm.      Pulses: Normal pulses.      Heart sounds: Normal heart sounds.   Pulmonary:      Effort: Pulmonary effort is normal.      Breath sounds: Normal breath sounds.   Musculoskeletal:      Cervical back: Neck supple.      Right lower leg: No edema.      Left lower leg: No edema.   Lymphadenopathy:      Cervical: No cervical adenopathy.   Skin:     General: Skin is warm and dry.   Neurological:      General: No focal deficit present.      Mental Status: She is alert and oriented to person, place, and time.   Psychiatric:         Mood and Affect: Mood normal.         Behavior: Behavior normal.          Assessment and Plan: 274}     1. Essential hypertension  Comments:  BP well controlled on losartan 50 mg every morning and 25 mg every evening.  Overview:  5/18/23 started losartan 50 mg daily    Orders:  -     losartan (COZAAR) 50 MG tablet; TAKE 1 TABLET BY MOUTH ONCE DAILY IN THE MORNING AND 1/2 (ONE-HALF) ONCE DAILY IN THE EVENING FOR BLOOD PRESSURE  Dispense: 135 tablet; Refill: 1  -     Comprehensive Metabolic Panel; Future; Expected date: 03/12/2024  -     Lipid Panel; Future; Expected date: 03/12/2024    2. Pulmonary emphysema, unspecified emphysema type  Comments:  Stable, no symptoms.  Overview:  noted on LDCT    Assessment & Plan:  Emphysematous change of the lungs present on LDCT 12/19/22.      3. Encounter for long-term (current) use of other medications  -     Comprehensive Metabolic Panel; Future; Expected date: 03/12/2024  -     Hemoglobin A1C; Future; Expected date: 03/12/2024  -     Lipid Panel; Future; Expected date: 03/12/2024  -     TSH; Future; Expected date: 03/12/2024    4. Hypercholesteremia  Overview:  declined statin    Orders:  -     Comprehensive Metabolic Panel; Future; Expected date: 03/12/2024  -     Lipid Panel; Future; Expected date:  03/12/2024    5. Atherosclerosis of artery  Assessment & Plan:  Noted on LDCT and per patient chiropractor noted on x-rays at his office.  Agreed to start rosuvastatin and is tolerating without problems.  Recheck lipids today.         Declines DEXA.  Advised vaping cessation.    Return to clinic 6-mth f/u HTN; and sooner as needed.    Future Appointments   Date Time Provider Department Center   9/12/2024  1:20 PM Tiffany Ray FNP [facility]   1/15/2025  1:20 PM [facility] [place] [facility] [place]        Signature:  SENAIT Mendez

## 2024-03-13 LAB
EST. AVERAGE GLUCOSE BLD GHB EST-MCNC: 105 MG/DL
HBA1C MFR BLD HPLC: 5.3 % (ref 4.5–6.6)

## 2024-03-18 ENCOUNTER — TELEPHONE (OUTPATIENT)
Dept: FAMILY MEDICINE | Facility: CLINIC | Age: 69
End: 2024-03-18
Payer: MEDICARE

## 2024-03-18 NOTE — TELEPHONE ENCOUNTER
----- Message from SENAIT Mendez sent at 3/18/2024  7:18 AM CDT -----  I had sent a message about her lab results and her bilirubin is high all of a sudden.  I was thinking more about it and we may should get a GB US to make sure she doesn't have a stone in her bile duct.

## 2024-03-18 NOTE — PROGRESS NOTES
Call pt and review results. Not real sure why her bilirubin is elevated because all her other liver function tests are normal.  Lipids are SO much better - LDL is right where it needs to be!  Other labs look great.

## 2024-07-09 DIAGNOSIS — Z71.89 COMPLEX CARE COORDINATION: ICD-10-CM

## 2024-10-07 PROBLEM — E78.49 OTHER HYPERLIPIDEMIA: Chronic | Status: ACTIVE | Noted: 2019-11-11

## 2024-10-16 ENCOUNTER — OFFICE VISIT (OUTPATIENT)
Dept: FAMILY MEDICINE | Facility: CLINIC | Age: 69
End: 2024-10-16
Payer: MEDICARE

## 2024-10-16 VITALS
DIASTOLIC BLOOD PRESSURE: 76 MMHG | BODY MASS INDEX: 23.07 KG/M2 | RESPIRATION RATE: 18 BRPM | TEMPERATURE: 98 F | OXYGEN SATURATION: 99 % | HEART RATE: 50 BPM | WEIGHT: 147 LBS | HEIGHT: 67 IN | SYSTOLIC BLOOD PRESSURE: 122 MMHG

## 2024-10-16 DIAGNOSIS — I10 ESSENTIAL HYPERTENSION: Primary | Chronic | ICD-10-CM

## 2024-10-16 DIAGNOSIS — Z23 FLU VACCINE NEED: ICD-10-CM

## 2024-10-16 DIAGNOSIS — K12.0 APHTHOUS ULCER OF MOUTH: ICD-10-CM

## 2024-10-16 PROCEDURE — G0008 ADMIN INFLUENZA VIRUS VAC: HCPCS | Mod: ,,, | Performed by: NURSE PRACTITIONER

## 2024-10-16 PROCEDURE — 99213 OFFICE O/P EST LOW 20 MIN: CPT | Mod: ,,, | Performed by: NURSE PRACTITIONER

## 2024-10-16 PROCEDURE — 90653 IIV ADJUVANT VACCINE IM: CPT | Mod: ,,, | Performed by: NURSE PRACTITIONER

## 2024-10-16 RX ORDER — LOSARTAN POTASSIUM 50 MG/1
TABLET ORAL
Qty: 135 TABLET | Refills: 3 | Status: SHIPPED | OUTPATIENT
Start: 2024-10-16

## 2024-10-16 NOTE — PROGRESS NOTES
[facility]  [address] [place] [address]  Phone: [phone]  Fax: [phone]       PATIENT NAME: Mirian Gallegos   : 1955    AGE: 69 y.o. DATE OF ENCOUNTER: 10/16/24    MRN: [id number]      PCP: Tiffany Ray FNP    Subjective:     Reason for Visit / Chief Complaint:     274}  Chief Complaint   Patient presents with    Hypertension     Patient reports to the clinic for 6 month follow up. She c/o ulcers in her mouth starting in July.    Health Maintenance     Care gaps addressed, patient would like her Flu vaccine today, declines DEXA scan.    Lolis Martino, [facility]     6 mth f/u HTN    Reports she has been plagued with ulcers in her mouth for 3-mths.  Was going to come in July but couldn't get in and saw her dentist in [facility] with ulcers that should resolve 7-10 days but didn't so she went back.  Topical benadryl. Nausea so painful.  Getting new ones when others resolve.  Last about 3 wks. Saw NP at [facility] with same diagnosis.      Review of Systems:     Review of Systems   Constitutional: Negative.    HENT:  Positive for mouth sores. Negative for congestion, rhinorrhea, sore throat, trouble swallowing and voice change.    Respiratory: Negative.     Cardiovascular: Negative.    Gastrointestinal: Negative.    Skin: Negative.    Neurological: Negative.    Psychiatric/Behavioral: Negative.         Allergies and Meds: 274}     Review of patient's allergies indicates:  No Known Allergies     Current Outpatient Medications   Medication Sig Dispense Refill    coenzyme Q10 (COQ-10) 100 mg capsule Take 100 mg by mouth once daily.      magnesium 250 mg Tab Take 1 tablet by mouth once daily.      multivit with calcium,iron,min (WOMEN'S MULTIPLE VITAMINS ORAL) Take 1 tablet by mouth Daily.      rosuvastatin (CRESTOR) 10 MG tablet Take 1 tablet (10 mg total) by mouth once daily. 90 tablet 3    losartan (COZAAR) 50 MG tablet Take 1 tablet by mouth  once daily in the morning (50 mg) and take 1/2 tablet (25 mg) daily in the evening for blood pressure. 135 tablet 3     No current facility-administered medications for this visit.       Labs:274}   I have reviewed labs below:    Lab Results   Component Value Date    WBC 6.10 2022    RBC 4.37 2022    HGB 12.6 2022    HCT 39.9 2022     2022     2024    K 4.0 2024     (H) 2024    CALCIUM 9.2 2024    GLU 79 2024    BUN 14 2024    CREATININE 0.82 2024    ALT 28 2024    AST 30 2024    CHOL 155 2024    TRIG 44 2024    HDL 64 (H) 2024    LDLCALC 82 2024    TSH 2.940 2024    HGBA1C 5.3 2024       Medical History: 274}     Past Medical History:   Diagnosis Date    Colon cancer 2020    stage 2, Dr. Albaro Garcia - robotic assisted laparoscopic right hemicolectomy 2020    Emphysema of lung 2019    noted on LDCT    Hypercholesteremia 2019    declined statin    Personal history of colonic polyps 2020    Statin declined     Tubular adenoma of colon 2020    Mid Transverse (Fragments)    Tubulovillous adenoma 2020    Multiple Pieces - Proximal Descending Colon      Social History     Tobacco Use   Smoking Status Former    Current packs/day: 0.00    Average packs/day: 1 pack/day for 52.6 years (52.6 ttl pk-yrs)    Types: Vaping with nicotine, Cigarettes    Start date: 1971    Quit date: 2023    Years since quittin.8   Smokeless Tobacco Never   Tobacco Comments    Smoked at least 1 ppd x 50 years = 50 year pack history.    Quit smoking cigarettes 21, still vaping w/ nicotine daily      Past Surgical History:   Procedure Laterality Date    COLONOSCOPY  2020    Dr. Jorge A Hernandez [facility]    OOPHORECTOMY      with preservation of uterus    RIGHT HEMICOLECTOMY  2020    robotic assisted laparoscopic per  "Dr. Albaro Garcia, [facility]        Health Maintenance:      Health Maintenance Topics with due status: Not Due       Topic Last Completion Date    Colorectal Cancer Screening 03/10/2021    LDCT Lung Screen 12/29/2023    Lipid Panel 03/12/2024    High Dose Statin 10/16/2024       Objective:  274}   Vital Signs  Temp: 97.8 °F (36.6 °C)  Temp Source: Oral  Pulse: (!) 50  Resp: 18  SpO2: 99 %  BP: 122/76  BP Location: Left arm  Patient Position: Sitting  Pain Score: 0-No pain  Height and Weight  Height: 5' 7" (170.2 cm)  Weight: 66.7 kg (147 lb)  BSA (Calculated - sq m): 1.78 sq meters  BMI (Calculated): 23  Weight in (lb) to have BMI = 25: 159.3    Over the last two weeks how often have you been bothered by little interest or pleasure in doing things: 0  Over the last two weeks how often have you been bothered by feeling down, depressed or hopeless: 0  PHQ-2 Total Score: 0  PHQ-9 Score: 2  PHQ-9 Interpretation: Minimal or None    Wt Readings from Last 3 Encounters:   10/16/24 66.7 kg (147 lb)   03/12/24 65.3 kg (144 lb)   01/10/24 63.5 kg (140 lb)     Physical Exam  Vitals and nursing note reviewed.   Constitutional:       General: She is not in acute distress.     Appearance: Normal appearance. She is not ill-appearing.   HENT:      Head: Normocephalic.      Mouth/Throat:      Mouth: Oral lesions (ulceration to left buccal mucosa) present.      Tongue: Lesions (ulceration to lateral posterior aspect of tongue) present.      Palate: No lesions.      Pharynx: Oropharynx is clear. Uvula midline. No posterior oropharyngeal erythema or uvula swelling.   Eyes:      Conjunctiva/sclera: Conjunctivae normal.   Neck:      Trachea: Trachea normal.   Cardiovascular:      Rate and Rhythm: Normal rate and regular rhythm.      Pulses: Normal pulses.      Heart sounds: Normal heart sounds.   Pulmonary:      Effort: Pulmonary effort is normal. No respiratory distress.      Breath sounds: Normal breath sounds. No " wheezing, rhonchi or rales.   Musculoskeletal:      Cervical back: Neck supple.      Right lower leg: No edema.      Left lower leg: No edema.   Lymphadenopathy:      Cervical: No cervical adenopathy.   Skin:     General: Skin is warm and dry.      Coloration: Skin is not jaundiced or pale.   Neurological:      General: No focal deficit present.      Mental Status: She is alert and oriented to person, place, and time.      Gait: Gait normal.   Psychiatric:         Mood and Affect: Mood normal.         Behavior: Behavior normal.          Assessment and Plan: 274}     1. Essential hypertension  Overview:  5/18/23 started losartan 50 mg daily    Assessment & Plan:  Controlled  Continue losartan 50 mg every am and 1/2 tablet every pm.  Continue home BP monitoring. Goal is consistent SBP < 130 & DBP < 80.    Orders:  -     losartan (COZAAR) 50 MG tablet; Take 1 tablet by mouth once daily in the morning (50 mg) and take 1/2 tablet (25 mg) daily in the evening for blood pressure.  Dispense: 135 tablet; Refill: 3    2. Flu vaccine need  -     influenza (adjuvanted) (Fluad) 45 mcg/0.5 mL IM vaccine (> or = 64 yo) 0.5 mL    3. Aphthous ulcer of mouth  Assessment & Plan:  Advised can try taking L lysine 1000 mg every morning along with 500 mg of vitamin-C daily and can use OTC Col[employer] Peroxyl mouth rinse.  Advised if ulcerations persists particularly the 1 to her left posterior tongue she needs to ENT for possible biopsy.  Voiced understanding and is in agreement.        Diagnosis, risks, benefits, and side effects of any meds and treatment plan were discussed with the patient.  Patient to call or follow-up with any new or worsening symptoms or problems prior to next appointment.  All questions were answered to the satisfaction of the patient, and pt verbalized understanding and agreement to treatment plan.      Follow up in about 6 months (around 4/16/2025) for hypertension, with fasting labs.    Signature:  Tiffany CHRISTINA  SENAIT Ray-BC    Future Appointments   Date Time Provider Department Center   1/15/2025  1:20 PM [facility]   4/21/2025 10:40 AM Tiffany Ray FNP [facility]

## 2024-10-16 NOTE — ASSESSMENT & PLAN NOTE
Controlled  Continue losartan 50 mg every am and 1/2 tablet every pm.  Continue home BP monitoring. Goal is consistent SBP < 130 & DBP < 80.

## 2024-10-16 NOTE — ASSESSMENT & PLAN NOTE
Advised can try taking L lysine 1000 mg every morning along with 500 mg of vitamin-C daily and can use OTC Colgate Peroxyl mouth rinse.  Advised if ulcerations persists particularly the 1 to her left posterior tongue she needs to ENT for possible biopsy.  Voiced understanding and is in agreement.   For possible fever, fussiness, or pain-relief after vaccines:  Tylenol Infant Drops -- 5 ml every 4 hours, as needed (NOTE: the MMR and Varivax vaccines MAY cause fever and/or rash in 7-14 days, and this would not be unusual). Info on today's vaccines is included in the summary below.      Limit whole-milk to 16 oz daily (max) (for now, try gradually increasing whole-milk mixed with formula-bottles, until she is no longer taking formula and only whole-milk; this can be done over the course of 1-2 weeks)    Limit juice intake to 4-6 oz daily; encourage water intake    Referral for initial dental-eval provided    RETURN in 3 MONTHS, for 15 MONTH WELL-CHECK

## 2025-01-15 ENCOUNTER — HOSPITAL ENCOUNTER (OUTPATIENT)
Dept: RADIOLOGY | Facility: HOSPITAL | Age: 70
Discharge: HOME | End: 2025-01-15
Attending: NURSE PRACTITIONER
Payer: MEDICARE

## 2025-01-15 VITALS — BODY MASS INDEX: 22.76 KG/M2 | HEIGHT: 67 IN | WEIGHT: 145 LBS

## 2025-01-15 DIAGNOSIS — Z12.31 OTHER SCREENING MAMMOGRAM: ICD-10-CM

## 2025-01-15 PROCEDURE — 77067 SCR MAMMO BI INCL CAD: CPT | Mod: TC

## 2025-01-22 DIAGNOSIS — Z12.2 ENCOUNTER FOR SCREENING FOR MALIGNANT NEOPLASM OF LUNG IN FORMER SMOKER WHO QUIT IN PAST 15 YEARS WITH 30 PACK YEAR HISTORY OR GREATER: Primary | ICD-10-CM

## 2025-01-22 DIAGNOSIS — Z87.891 FORMER HEAVY CIGARETTE SMOKER (20-39 PER DAY): Chronic | ICD-10-CM

## 2025-01-22 DIAGNOSIS — Z87.891 ENCOUNTER FOR SCREENING FOR MALIGNANT NEOPLASM OF LUNG IN FORMER SMOKER WHO QUIT IN PAST 15 YEARS WITH 30 PACK YEAR HISTORY OR GREATER: Primary | ICD-10-CM

## 2025-02-12 ENCOUNTER — HOSPITAL ENCOUNTER (OUTPATIENT)
Dept: RADIOLOGY | Facility: HOSPITAL | Age: 70
Discharge: HOME | End: 2025-02-12
Attending: NURSE PRACTITIONER
Payer: MEDICARE

## 2025-02-12 VITALS — WEIGHT: 145 LBS | BODY MASS INDEX: 22.76 KG/M2 | HEIGHT: 67 IN

## 2025-02-12 DIAGNOSIS — Z87.891 ENCOUNTER FOR SCREENING FOR MALIGNANT NEOPLASM OF LUNG IN FORMER SMOKER WHO QUIT IN PAST 15 YEARS WITH 30 PACK YEAR HISTORY OR GREATER: ICD-10-CM

## 2025-02-12 DIAGNOSIS — Z12.2 ENCOUNTER FOR SCREENING FOR MALIGNANT NEOPLASM OF LUNG IN FORMER SMOKER WHO QUIT IN PAST 15 YEARS WITH 30 PACK YEAR HISTORY OR GREATER: ICD-10-CM

## 2025-02-12 DIAGNOSIS — Z87.891 FORMER HEAVY CIGARETTE SMOKER (20-39 PER DAY): Chronic | ICD-10-CM

## 2025-02-12 PROCEDURE — 71271 CT THORAX LUNG CANCER SCR C-: CPT | Mod: 26,,, | Performed by: RADIOLOGY

## 2025-02-12 PROCEDURE — 71271 CT THORAX LUNG CANCER SCR C-: CPT | Mod: TC

## 2025-02-13 NOTE — PROGRESS NOTES
LDCT is negative for suspicious findings or cancer in the lungs.  Findings of moderate emphysema.  I can refer her to pulmonology at any point if the emphysema become symptomatic or if desired.  Continued annual screening recommended.

## 2025-02-13 NOTE — PROGRESS NOTES
Patient notified of results.  Explain she can be referred to pulmonary when and if she starts having problems.

## 2025-05-06 ENCOUNTER — OFFICE VISIT (OUTPATIENT)
Dept: FAMILY MEDICINE | Facility: CLINIC | Age: 70
End: 2025-05-06
Payer: MEDICARE

## 2025-05-06 VITALS
WEIGHT: 146.38 LBS | HEIGHT: 67 IN | DIASTOLIC BLOOD PRESSURE: 73 MMHG | HEART RATE: 58 BPM | SYSTOLIC BLOOD PRESSURE: 115 MMHG | OXYGEN SATURATION: 100 % | BODY MASS INDEX: 22.98 KG/M2 | TEMPERATURE: 98 F | RESPIRATION RATE: 18 BRPM

## 2025-05-06 DIAGNOSIS — E78.49 OTHER HYPERLIPIDEMIA: Primary | Chronic | ICD-10-CM

## 2025-05-06 DIAGNOSIS — I10 ESSENTIAL HYPERTENSION: ICD-10-CM

## 2025-05-06 DIAGNOSIS — R25.2 LEG CRAMPING: ICD-10-CM

## 2025-05-06 DIAGNOSIS — J43.9 PULMONARY EMPHYSEMA, UNSPECIFIED EMPHYSEMA TYPE: Chronic | ICD-10-CM

## 2025-05-06 DIAGNOSIS — Z79.899 OTHER LONG TERM (CURRENT) DRUG THERAPY: ICD-10-CM

## 2025-05-06 PROBLEM — K12.0 APHTHOUS ULCER OF MOUTH: Status: RESOLVED | Noted: 2024-10-16 | Resolved: 2025-05-06

## 2025-05-06 LAB
ALBUMIN SERPL BCP-MCNC: 4.1 G/DL (ref 3.4–4.8)
ALBUMIN/GLOB SERPL: 1.3 {RATIO}
ALP SERPL-CCNC: 73 U/L (ref 40–150)
ALT SERPL W P-5'-P-CCNC: 25 U/L
ANION GAP SERPL CALCULATED.3IONS-SCNC: 14 MMOL/L (ref 7–16)
AST SERPL W P-5'-P-CCNC: 38 U/L (ref 11–45)
BASOPHILS # BLD AUTO: 0.02 K/UL (ref 0–0.2)
BASOPHILS NFR BLD AUTO: 0.3 % (ref 0–1)
BILIRUB SERPL-MCNC: 1.9 MG/DL
BUN SERPL-MCNC: 13 MG/DL (ref 10–20)
BUN/CREAT SERPL: 15 (ref 6–20)
CALCIUM SERPL-MCNC: 9.2 MG/DL (ref 8.4–10.2)
CHLORIDE SERPL-SCNC: 105 MMOL/L (ref 98–107)
CHOLEST SERPL-MCNC: 144 MG/DL
CHOLEST/HDLC SERPL: 2.4 {RATIO}
CO2 SERPL-SCNC: 27 MMOL/L (ref 23–31)
CREAT SERPL-MCNC: 0.86 MG/DL (ref 0.55–1.02)
DIFFERENTIAL METHOD BLD: ABNORMAL
EGFR (NO RACE VARIABLE) (RUSH/TITUS): 72 ML/MIN/1.73M2
EOSINOPHIL # BLD AUTO: 0.07 K/UL (ref 0–0.5)
EOSINOPHIL NFR BLD AUTO: 1.1 % (ref 1–4)
ERYTHROCYTE [DISTWIDTH] IN BLOOD BY AUTOMATED COUNT: 13.4 % (ref 11.5–14.5)
GLOBULIN SER-MCNC: 3.2 G/DL (ref 2–4)
GLUCOSE SERPL-MCNC: 70 MG/DL (ref 82–115)
HCT VFR BLD AUTO: 37.4 % (ref 38–47)
HDLC SERPL-MCNC: 60 MG/DL (ref 35–60)
HGB BLD-MCNC: 11.8 G/DL (ref 12–16)
IMM GRANULOCYTES # BLD AUTO: 0.01 K/UL (ref 0–0.04)
IMM GRANULOCYTES NFR BLD: 0.2 % (ref 0–0.4)
LDLC SERPL CALC-MCNC: 73 MG/DL
LDLC/HDLC SERPL: 1.2 {RATIO}
LYMPHOCYTES # BLD AUTO: 1.87 K/UL (ref 1–4.8)
LYMPHOCYTES NFR BLD AUTO: 28.6 % (ref 27–41)
MAGNESIUM SERPL-MCNC: 2.7 MG/DL (ref 1.6–2.6)
MCH RBC QN AUTO: 28.4 PG (ref 27–31)
MCHC RBC AUTO-ENTMCNC: 31.6 G/DL (ref 32–36)
MCV RBC AUTO: 89.9 FL (ref 80–96)
MONOCYTES # BLD AUTO: 0.51 K/UL (ref 0–0.8)
MONOCYTES NFR BLD AUTO: 7.8 % (ref 2–6)
MPC BLD CALC-MCNC: 12.9 FL (ref 9.4–12.4)
NEUTROPHILS # BLD AUTO: 4.05 K/UL (ref 1.8–7.7)
NEUTROPHILS NFR BLD AUTO: 62 % (ref 53–65)
NONHDLC SERPL-MCNC: 84 MG/DL
NRBC # BLD AUTO: 0 X10E3/UL
NRBC, AUTO (.00): 0 %
PLATELET # BLD AUTO: 150 K/UL (ref 150–400)
POTASSIUM SERPL-SCNC: 3.9 MMOL/L (ref 3.5–5.1)
PROT SERPL-MCNC: 7.3 G/DL (ref 5.8–7.6)
RBC # BLD AUTO: 4.16 M/UL (ref 4.2–5.4)
SODIUM SERPL-SCNC: 142 MMOL/L (ref 136–145)
TRIGL SERPL-MCNC: 57 MG/DL (ref 37–140)
TSH SERPL DL<=0.005 MIU/L-ACNC: 2.57 UIU/ML (ref 0.35–4.94)
VLDLC SERPL-MCNC: 11 MG/DL
WBC # BLD AUTO: 6.53 K/UL (ref 4.5–11)

## 2025-05-06 PROCEDURE — 83735 ASSAY OF MAGNESIUM: CPT | Mod: ,,, | Performed by: CLINICAL MEDICAL LABORATORY

## 2025-05-06 PROCEDURE — 80061 LIPID PANEL: CPT | Mod: ,,, | Performed by: CLINICAL MEDICAL LABORATORY

## 2025-05-06 PROCEDURE — 99214 OFFICE O/P EST MOD 30 MIN: CPT | Mod: ,,, | Performed by: NURSE PRACTITIONER

## 2025-05-06 PROCEDURE — 80053 COMPREHEN METABOLIC PANEL: CPT | Mod: ,,, | Performed by: CLINICAL MEDICAL LABORATORY

## 2025-05-06 PROCEDURE — 84443 ASSAY THYROID STIM HORMONE: CPT | Mod: ,,, | Performed by: CLINICAL MEDICAL LABORATORY

## 2025-05-06 PROCEDURE — 85025 COMPLETE CBC W/AUTO DIFF WBC: CPT | Mod: ,,, | Performed by: CLINICAL MEDICAL LABORATORY

## 2025-05-06 NOTE — ASSESSMENT & PLAN NOTE
Lipids improved on statin, previous results reviewed with patient.  Continue rosuvastatin.  Denies medication side effects.

## 2025-05-06 NOTE — ASSESSMENT & PLAN NOTE
Controlled  BP Readings from Last 3 Encounters:   05/06/25 115/73   10/16/24 122/76   03/12/24 115/72     Continue losartan 50 mg every am and 1/2 tablet every pm.  Continue home BP monitoring. Goal is consistent SBP < 130 & DBP < 80.

## 2025-05-06 NOTE — PROGRESS NOTES
[facility]  [address] [place] [address]  Phone: [phone]  Fax: [phone]       PATIENT NAME: Mirian Gallegos   : 1955    AGE: 70 y.o. DATE OF ENCOUNTER: 25    MRN: [id number]      PCP: Tiffany Ray FNP    Subjective:   CHANGE CHIEF COMPLAINT      :[id number]}274}  Chief Complaint   Patient presents with    Hypertension     Patient reports to the clinic today for her 6 month follow up.    Health Maintenance     Care gaps addressed, patient declines all vaccines and screenings today.    Lolis Martino CMA     History of Present Illness    HPI:  Patient reports cramps in her feet while working in her yard since February. She describes intense muscle contractions with involuntary toe movements. Yesterday, while using a push lawnmower, she had a severe leg cramp extending from her foot up her entire leg, which was unprecedented. Patient notes typical discomfort when starting yard work early in the year, usually improving as she builds strength.    Patient increased fluid intake after heat-related symptoms last summer. She now carries a water bottle and electrolyte drink while working outside. Her daily water intake is inconsistent, typically consuming about 3 cups of coffee followed by diet soda during the day.  She admits insufficient water intake on a daily basis.    Patient reports feeling cold frequently, particularly in her hands and feet. Her feet get so cold in [place] that it affects her ability to drive afterward. She carries a sweater, even in summer, due to cold indoor temperatures.    Patient has a history of colon cancer, with her last colonoscopy on March 10, 2021, where a few small polyps were removed. She is due for another colonoscopy in March of next year. She had a mammogram and lung scan in January.    Patient denies any problems with her cholesterol medication or constant aches associated with it. She denies any thyroid trouble in the  past.      ROS:  Musculoskeletal: +muscle cramps  Integumentary: +cold extremities         Allergies and Meds: 274}     Review of patient's allergies indicates:  No Known Allergies     Current Outpatient Medications   Medication Sig Dispense Refill    coenzyme Q10 (COQ-10) 100 mg capsule Take 100 mg by mouth once daily.      losartan (COZAAR) 50 MG tablet Take 1 tablet by mouth once daily in the morning (50 mg) and take 1/2 tablet (25 mg) daily in the evening for blood pressure. 135 tablet 3    magnesium 250 mg Tab Take 1 tablet by mouth once daily.      multivit with calcium,iron,min (WOMEN'S MULTIPLE VITAMINS ORAL) Take 1 tablet by mouth Daily.      rosuvastatin (CRESTOR) 10 MG tablet Take 1 tablet (10 mg total) by mouth once daily. 90 tablet 3     No current facility-administered medications for this visit.       Labs:274}   I have reviewed labs below:    Lab Results   Component Value Date    WBC 6.10 11/07/2022    RBC 4.37 11/07/2022    HGB 12.6 11/07/2022    HCT 39.9 11/07/2022     11/07/2022     03/12/2024    K 4.0 03/12/2024     (H) 03/12/2024    CALCIUM 9.2 03/12/2024    GLU 79 03/12/2024    BUN 14 03/12/2024    CREATININE 0.82 03/12/2024    ALT 28 03/12/2024    AST 30 03/12/2024    CHOL 155 03/12/2024    TRIG 44 03/12/2024    HDL 64 (H) 03/12/2024    LDLCALC 82 03/12/2024    TSH 2.940 03/12/2024    HGBA1C 5.3 03/12/2024       Medical History: 274}     Past Medical History:   Diagnosis Date    Colon cancer 02/18/2020    stage 2, Dr. 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 Jorge A Hernandez [facility]    OOPHORECTOMY      with preservation of uterus    RIGHT HEMICOLECTOMY  03/17/2020    robotic assisted laparoscopic per Dr. Albaro Garcia, [facility]        Health Maintenance:      Health Maintenance Topics with due status: Not Due       Topic Last Completion Date    Colorectal Cancer Screening 03/10/2021    Lipid Panel 03/12/2024    Mammogram 01/15/2025    LDCT Lung Screen 02/12/2025    High Dose Statin 05/06/2025       Objective:  274}   Vital Signs  Temp: 97.7 °F (36.5 °C)  Temp Source: Oral  Pulse: (!) 58  Resp: 18  SpO2: 100 %  BP: 115/73  BP Location: Left arm  Patient Position: Sitting  Pain Score: 0-No pain  Height and Weight  Height: 5' 7" (170.2 cm)  Weight: 66.4 kg (146 lb 6.4 oz)  BSA (Calculated - sq m): 1.77 sq meters  BMI (Calculated): 22.9  Weight in (lb) to have BMI = 25: 159.3    Over the last two weeks how often have you been bothered by little interest or pleasure in doing things: 0  Over the last two weeks how often have you been bothered by feeling down, depressed or hopeless: 0  PHQ-2 Total Score: 0    Wt Readings from Last 3 Encounters:   05/06/25 66.4 kg (146 lb 6.4 oz)   02/12/25 65.8 kg (145 lb)   01/15/25 65.8 kg (145 lb)     Physical Exam  Vitals and nursing note reviewed.   Constitutional:       General: She is not in acute distress.     Appearance: Normal appearance. She is not ill-appearing.   HENT:      Head: Normocephalic.      Right Ear: Tympanic membrane, ear canal and external ear normal.      Left Ear: Tympanic membrane, ear canal and external ear normal.      Nose: Nose normal.      Mouth/Throat:      Mouth: Mucous membranes are moist.      Pharynx: Oropharynx is clear. Uvula midline. No posterior oropharyngeal erythema or uvula swelling.   Eyes:      Conjunctiva/sclera: Conjunctivae normal.      Pupils: Pupils are equal, round, and reactive to light.   Neck:    "   Thyroid: No thyroid mass or thyromegaly.      Vascular: Normal carotid pulses. No carotid bruit.   Cardiovascular:      Rate and Rhythm: Normal rate and regular rhythm.      Pulses: Normal pulses.      Heart sounds: Normal heart sounds.   Pulmonary:      Effort: Pulmonary effort is normal. No respiratory distress.      Breath sounds: Normal breath sounds. No wheezing, rhonchi or rales.   Abdominal:      Palpations: Abdomen is soft.      Tenderness: There is no abdominal tenderness.   Musculoskeletal:      Cervical back: Neck supple.      Right lower leg: No edema.      Left lower leg: No edema.   Lymphadenopathy:      Cervical: No cervical adenopathy.   Skin:     General: Skin is warm and dry.      Coloration: Skin is not jaundiced or pale.   Neurological:      General: No focal deficit present.      Mental Status: She is alert and oriented to person, place, and time.      Gait: Gait normal.   Psychiatric:         Mood and Affect: Mood normal.         Behavior: Behavior normal.          Assessment and Plan: 274}       1. Other hyperlipidemia  Assessment & Plan:  Lipids improved on statin, previous results reviewed with patient.  Continue rosuvastatin.  Denies medication side effects.    Orders:  -     Comprehensive Metabolic Panel; Future; Expected date: 05/06/2025  -     Lipid Panel; Future; Expected date: 05/06/2025    2. Other long term (current) drug therapy  -     CBC Auto Differential; Future; Expected date: 05/06/2025  -     Comprehensive Metabolic Panel; Future; Expected date: 05/06/2025  -     TSH; Future; Expected date: 05/06/2025    3. Essential hypertension  Overview:  5/18/23 started losartan 50 mg daily    Assessment & Plan:  Controlled  BP Readings from Last 3 Encounters:   05/06/25 115/73   10/16/24 122/76   03/12/24 115/72     Continue losartan 50 mg every am and 1/2 tablet every pm.  Continue home BP monitoring. Goal is consistent SBP < 130 & DBP < 80.    Orders:  -     CBC Auto Differential;  Future; Expected date: 2025  -     Comprehensive Metabolic Panel; Future; Expected date: 2025  -     TSH; Future; Expected date: 2025    4. Leg cramping  -     Comprehensive Metabolic Panel; Future; Expected date: 2025  -     Magnesium; Future; Expected date: 2025    5. Pulmonary emphysema, unspecified emphysema type  Overview:  noted on LDCT    Assessment & Plan:  Emphysematous change of the lungs present on LDCT 22.; moderate emphysema noted on LDCT 25.  Continue yearly LDCT lung cancer screening.  Advised vaping cessation.          Assessment & Plan    IMPRESSION:  - Cholesterol levels significantly improved since starting cholesterol medication (total cholesterol decreased from 225 to 155, LDL from 136 to 82).  - Evaluated leg cramps, considering dehydration as likely cause rather than cholesterol medication side effect, explained difference between muscle cramps likely due to dehydration and potential cholesterol medication side effects (constant ache vs. intermittent cramps).  - BP management effective with current losartan regimen, continue losartan 50 m tablet in the morning, 1/2 tablet in the evening.  - Next colonoscopy due in 2026 based on history of colon cancer.    HYPERLIPIDEMIA:  - Patient has been on cholesterol medication since last March with good response.  - Previous levels were 225 total, 136 LDL before medication, improving to 155 total, 82 LDL after starting medication.  - As it has been over 1 year since the last check, ordered lipid panel today; patient fasted for the test.  - Follow up in 1 year for hyperlipidemia management, fasting before the appointment.    HYPERTENSION:  - Blood pressure is well-controlled with current losartan 50 mg regimen (1 tablet in the morning, 1/2 tablet in the evening).  - Discussed dehydrating effects of caffeine as a natural diuretic and advised patient to reduce caffeine intake.  - Follow up in 1 year unless  needed sooner.    LEG CRAMPS:  - Patient experiences severe leg cramps, possibly related to dehydration and electrolyte imbalance.  - Ordered electrolyte panel including potassium and magnesium levels.  - Advised patient to drink 68 to 72 ounces of water daily, more when it's hot, to prevent dehydration-related cramps.    COLD EXTREMITIES:  - Patient reports cold hands and feet, especially at night and in cold environments.  - This may be related to thyroid function affecting temperature regulation.  - Ordered thyroid function test to evaluate cause.    NICOTINE DEPENDENCE:  - Patient quit smoking years ago but continues to vape and acknowledges the struggle to quit.    HISTORY OF COLON CANCER:  - Patient is doing well post-colon cancer treatment with no symptoms reported.  - Last colonoscopy in March 2021 showed tiny, benign polyps.  - Follow-up colonoscopy recommended in March 2026 as per previous recommendations.    HISTORY OF COLON POLYPS:  - Tiny, benign polyps were removed during last colonoscopy in March 2021 with no further issues reported.  - Follow-up colonoscopy scheduled for March 2026.    GENERAL RECOMMENDATIONS AND FOLLOW-UP:  - Emphasized importance of adequate hydration (68-72 oz of water daily), especially when working outside or in hot weather.  - Follow up sooner if needed.       Follow up in about 6 months (around 11/6/2025) for hypertension.    Signature:  SENAIT Mendez-BC    Future Appointments   Date Time Provider Department Center   1/21/2026  2:00 PM [facility] [place]   5/6/2026  1:40 PM Tiffany Ray FNP [facility] [place]     This note was generated with the assistance of ambient listening technology. Verbal consent was obtained by the patient and accompanying visitor(s) for the recording of patient appointment to facilitate this note. I attest to having reviewed and edited the generated note for accuracy, though some syntax or spelling  errors may persist. Please contact the author of this note for any clarification.           [1]   Social History  Tobacco Use   Smoking Status Former    Current packs/day: 0.00    Average packs/day: 1 pack/day for 52.6 years (52.6 ttl pk-yrs)    Types: Vaping with nicotine, Cigarettes    Start date: 1971    Quit date: 2023    Years since quittin.3   Smokeless Tobacco Never   Tobacco Comments    Smoked at least 1 ppd x 50 years = 50 year pack history.    Quit smoking cigarettes 19, still vaping w/ nicotine daily

## 2025-05-06 NOTE — ASSESSMENT & PLAN NOTE
Emphysematous change of the lungs present on LDCT 12/19/22.; moderate emphysema noted on LDCT 2/12/25.  Continue yearly LDCT lung cancer screening.  Advised vaping cessation.

## 2025-05-11 ENCOUNTER — RESULTS FOLLOW-UP (OUTPATIENT)
Dept: FAMILY MEDICINE | Facility: CLINIC | Age: 70
End: 2025-05-11

## 2025-05-11 DIAGNOSIS — D64.9 MILD ANEMIA: Primary | ICD-10-CM

## 2025-05-11 DIAGNOSIS — Z79.899 OTHER LONG TERM (CURRENT) DRUG THERAPY: ICD-10-CM

## 2025-05-12 NOTE — PROGRESS NOTES
Call patient and review results. Lipids are well controlled with rosuvastatin.  She is mildly anemic which is a new finding.  I recommend repeat CBC in 1 month along with iron/B12/folate levels.  Lab only orders are in.

## 2025-06-17 ENCOUNTER — RESULTS FOLLOW-UP (OUTPATIENT)
Dept: FAMILY MEDICINE | Facility: CLINIC | Age: 70
End: 2025-06-17